# Patient Record
Sex: MALE | Race: WHITE | NOT HISPANIC OR LATINO | URBAN - METROPOLITAN AREA
[De-identification: names, ages, dates, MRNs, and addresses within clinical notes are randomized per-mention and may not be internally consistent; named-entity substitution may affect disease eponyms.]

---

## 2020-05-02 ENCOUNTER — EMERGENCY (EMERGENCY)
Facility: HOSPITAL | Age: 60
LOS: 1 days | Discharge: HOME | End: 2020-05-02
Attending: EMERGENCY MEDICINE
Payer: SELF-PAY

## 2020-05-02 VITALS
WEIGHT: 195.11 LBS | SYSTOLIC BLOOD PRESSURE: 125 MMHG | TEMPERATURE: 98 F | HEART RATE: 64 BPM | OXYGEN SATURATION: 100 % | RESPIRATION RATE: 15 BRPM | HEIGHT: 74 IN | DIASTOLIC BLOOD PRESSURE: 88 MMHG

## 2020-05-02 VITALS
OXYGEN SATURATION: 100 % | RESPIRATION RATE: 19 BRPM | DIASTOLIC BLOOD PRESSURE: 78 MMHG | SYSTOLIC BLOOD PRESSURE: 110 MMHG | HEART RATE: 82 BPM

## 2020-05-02 PROCEDURE — 82962 GLUCOSE BLOOD TEST: CPT

## 2020-05-02 PROCEDURE — 99283 EMERGENCY DEPT VISIT LOW MDM: CPT

## 2020-05-02 NOTE — ED PROVIDER NOTE - CLINICAL SUMMARY MEDICAL DECISION MAKING FREE TEXT BOX
hypoglycemia in light of exercise, heat. Pt took insulin this morning. Refusing to stay in the ER for further evaluation. Tolerating PO glucose given by RN. Called  Endo while in ED for follow up. Rec not to take evening insulin and to verify doses with endocrinology as outpt today which pt states he has follow up. Pt signed out AMA.

## 2020-05-02 NOTE — ED PROVIDER NOTE - PATIENT PORTAL LINK FT
You can access the FollowMyHealth Patient Portal offered by [facility] by registering at the following website: [email or web address]. By joining [employer]’s FollowMyHealth portal, you will also be able to view your health information using other applications (apps) compatible with our system.

## 2020-05-02 NOTE — ED ADULT NURSE REASSESSMENT NOTE - NS ED NURSE REASSESS COMMENT FT1
Patient signed out Against Medical Advice all Risks explained by Dr. JAY  Patient verbalizes understanding , and also given follow up instructions ( written and verbal)  Patient alert and oriented to name, date of birth, current events, location, current and previous presidents ( verbally identified presidents by name)   Patient also able to do serial addition and subtraction   Patient ambulated in ED with a steady gait,   Patient denies dizziness, chest pain and shortness of breath with ambulation   Patient instructed to hold evening dose of insulin, contact Endocrinology and monitor blood sugar levels at home . Patient also provided with additional apple juice and apple sauce and will eat once home   Patient verbalizes understanding and called Endocrinology during departure  Patient verbalizes and understands that he is to return to ER for any concerns

## 2020-05-02 NOTE — ED ADULT NURSE REASSESSMENT NOTE - NS ED NURSE REASSESS COMMENT FT1
Patient given apple juice, milk, apple sauce and a food tray Patient given apple juice, milk, and apple sauce

## 2020-05-02 NOTE — ED ADULT TRIAGE NOTE - CHIEF COMPLAINT QUOTE
hypoglycemic and fell, abrasions on hand   f/s was 23 2 oral glucose and 1 amp dextrose given prior to arrival

## 2020-05-02 NOTE — ED PROVIDER NOTE - OBJECTIVE STATEMENT
61 yo hx of DM on Novolin 20U in morning and 8u before dinner with complaints of hypoglycemia. Per pt, ran 3 miles this morning in sun, became hypoglycemic. Bystanders called EMS, after pt fell. Pt denies hitting head or any other body pain. Pt requesting to go home. Was given oral glucose tabs- 2 and 1 amp dextrose by EMS. Pt w no other somatic complaints, no headache, cough, URI sx, cp, sob, Denies associated SOB, NVD, lightheaded, diaphoresis, palpitations, cough/rhinorrhea, black/bloody stool, LE pain/swelling, focal weakness/numbness,  abd pain, urinary complaints, f/c.

## 2020-05-02 NOTE — ED PROVIDER NOTE - PHYSICAL EXAMINATION
CONSTITUTIONAL: Well appearing, well nourished, awake, alert and in no apparent distress.  HEENT: Head is atraumatic. Eyes clear bilaterally, normal EOMI. Airway patent.  CARDIAC: Normal rate, regular rhythm.  Heart sounds S1, S2.   RESPIRATORY: Breath sounds clear and equal bilaterally. no tachypnea, respiratory distress.   GASTROINTESTINAL: Abdomen soft, non-tender, no guarding, distension.  MUSCULOSKELETAL: Spine appears normal, no midline spinal tenderness, range of motion is not limited, no muscle or joint tenderness. no bony tenderness. no JVD, peripheral edema. no head injury, spine non tender. no cp, abd pain, ext injury  NEUROLOGICAL: Alert and oriented, no focal deficits, no motor or sensory deficits.  SKIN: Skin normal color for race, warm, dry and intact. No evidence of rash.  PSYCHIATRIC: Alert and oriented to person, place, time/situation. normal mood and affect. no apparent risk to self or others.

## 2020-05-23 ENCOUNTER — EMERGENCY (EMERGENCY)
Facility: HOSPITAL | Age: 60
LOS: 1 days | Discharge: HOME | End: 2020-05-23
Attending: EMERGENCY MEDICINE
Payer: SELF-PAY

## 2020-05-23 VITALS
DIASTOLIC BLOOD PRESSURE: 70 MMHG | TEMPERATURE: 98 F | HEART RATE: 76 BPM | HEIGHT: 71 IN | OXYGEN SATURATION: 98 % | RESPIRATION RATE: 20 BRPM | WEIGHT: 199.96 LBS | SYSTOLIC BLOOD PRESSURE: 146 MMHG

## 2020-05-23 LAB
ALBUMIN SERPL ELPH-MCNC: 4 G/DL — SIGNIFICANT CHANGE UP (ref 3.5–5)
ALP SERPL-CCNC: 135 U/L — HIGH (ref 40–120)
ALT FLD-CCNC: 56 U/L DA — SIGNIFICANT CHANGE UP (ref 10–60)
ANION GAP SERPL CALC-SCNC: 5 MMOL/L — SIGNIFICANT CHANGE UP (ref 5–17)
AST SERPL-CCNC: 49 U/L — HIGH (ref 10–40)
BASOPHILS # BLD AUTO: 0.09 K/UL — SIGNIFICANT CHANGE UP (ref 0–0.2)
BASOPHILS NFR BLD AUTO: 0.8 % — SIGNIFICANT CHANGE UP (ref 0–2)
BILIRUB SERPL-MCNC: 1.4 MG/DL — HIGH (ref 0.2–1.2)
BUN SERPL-MCNC: 9 MG/DL — SIGNIFICANT CHANGE UP (ref 7–18)
CALCIUM SERPL-MCNC: 9 MG/DL — SIGNIFICANT CHANGE UP (ref 8.4–10.5)
CHLORIDE SERPL-SCNC: 101 MMOL/L — SIGNIFICANT CHANGE UP (ref 96–108)
CO2 SERPL-SCNC: 34 MMOL/L — HIGH (ref 22–31)
CREAT SERPL-MCNC: 0.85 MG/DL — SIGNIFICANT CHANGE UP (ref 0.5–1.3)
EOSINOPHIL # BLD AUTO: 0.23 K/UL — SIGNIFICANT CHANGE UP (ref 0–0.5)
EOSINOPHIL NFR BLD AUTO: 2.1 % — SIGNIFICANT CHANGE UP (ref 0–6)
GLUCOSE SERPL-MCNC: 19 MG/DL — CRITICAL LOW (ref 70–99)
HCT VFR BLD CALC: 46.4 % — SIGNIFICANT CHANGE UP (ref 39–50)
HGB BLD-MCNC: 15.1 G/DL — SIGNIFICANT CHANGE UP (ref 13–17)
IMM GRANULOCYTES NFR BLD AUTO: 0.4 % — SIGNIFICANT CHANGE UP (ref 0–1.5)
LYMPHOCYTES # BLD AUTO: 1.74 K/UL — SIGNIFICANT CHANGE UP (ref 1–3.3)
LYMPHOCYTES # BLD AUTO: 15.9 % — SIGNIFICANT CHANGE UP (ref 13–44)
MCHC RBC-ENTMCNC: 30.6 PG — SIGNIFICANT CHANGE UP (ref 27–34)
MCHC RBC-ENTMCNC: 32.5 GM/DL — SIGNIFICANT CHANGE UP (ref 32–36)
MCV RBC AUTO: 93.9 FL — SIGNIFICANT CHANGE UP (ref 80–100)
MONOCYTES # BLD AUTO: 0.88 K/UL — SIGNIFICANT CHANGE UP (ref 0–0.9)
MONOCYTES NFR BLD AUTO: 8.1 % — SIGNIFICANT CHANGE UP (ref 2–14)
NEUTROPHILS # BLD AUTO: 7.95 K/UL — HIGH (ref 1.8–7.4)
NEUTROPHILS NFR BLD AUTO: 72.7 % — SIGNIFICANT CHANGE UP (ref 43–77)
NRBC # BLD: 0 /100 WBCS — SIGNIFICANT CHANGE UP (ref 0–0)
PLATELET # BLD AUTO: 274 K/UL — SIGNIFICANT CHANGE UP (ref 150–400)
POTASSIUM SERPL-MCNC: 3.4 MMOL/L — LOW (ref 3.5–5.3)
POTASSIUM SERPL-SCNC: 3.4 MMOL/L — LOW (ref 3.5–5.3)
PROT SERPL-MCNC: 8.4 G/DL — HIGH (ref 6–8.3)
RBC # BLD: 4.94 M/UL — SIGNIFICANT CHANGE UP (ref 4.2–5.8)
RBC # FLD: 12.7 % — SIGNIFICANT CHANGE UP (ref 10.3–14.5)
SODIUM SERPL-SCNC: 140 MMOL/L — SIGNIFICANT CHANGE UP (ref 135–145)
WBC # BLD: 10.93 K/UL — HIGH (ref 3.8–10.5)
WBC # FLD AUTO: 10.93 K/UL — HIGH (ref 3.8–10.5)

## 2020-05-23 PROCEDURE — 36415 COLL VENOUS BLD VENIPUNCTURE: CPT

## 2020-05-23 PROCEDURE — 99283 EMERGENCY DEPT VISIT LOW MDM: CPT

## 2020-05-23 PROCEDURE — 85027 COMPLETE CBC AUTOMATED: CPT

## 2020-05-23 PROCEDURE — 99284 EMERGENCY DEPT VISIT MOD MDM: CPT | Mod: 25

## 2020-05-23 PROCEDURE — 82962 GLUCOSE BLOOD TEST: CPT

## 2020-05-23 PROCEDURE — 80053 COMPREHEN METABOLIC PANEL: CPT

## 2020-05-23 PROCEDURE — 96374 THER/PROPH/DIAG INJ IV PUSH: CPT

## 2020-05-23 RX ORDER — DEXTROSE 50 % IN WATER 50 %
50 SYRINGE (ML) INTRAVENOUS ONCE
Refills: 0 | Status: COMPLETED | OUTPATIENT
Start: 2020-05-23 | End: 2020-05-23

## 2020-05-23 RX ORDER — GLUCAGON INJECTION, SOLUTION 0.5 MG/.1ML
1 INJECTION, SOLUTION SUBCUTANEOUS ONCE
Refills: 0 | Status: DISCONTINUED | OUTPATIENT
Start: 2020-05-23 | End: 2020-05-23

## 2020-05-23 RX ORDER — GLUCAGON INJECTION, SOLUTION 0.5 MG/.1ML
5 INJECTION, SOLUTION SUBCUTANEOUS ONCE
Refills: 0 | Status: DISCONTINUED | OUTPATIENT
Start: 2020-05-23 | End: 2020-05-23

## 2020-05-23 RX ADMIN — Medication 50 MILLILITER(S): at 16:00

## 2020-05-23 NOTE — ED PROVIDER NOTE - OBJECTIVE STATEMENT
61 y/o M with a significant PMHx of IDDM presents with AMS from street. EMS reports patient was uncooperative and resisting. Fingerstick monitor at the time read "low", per EMS.

## 2020-05-23 NOTE — ED ADULT NURSE NOTE - SKIN CAPILLARY REFILL
2 seconds or less You can access the [facility] Patient Portal, offered by [facility], by registering with the following website: [email or web address]

## 2020-05-23 NOTE — ED ADULT NURSE REASSESSMENT NOTE - NS ED NURSE REASSESS COMMENT FT1
pt verbalizes understanding of all risks associated w/ leaving AMA. pt A&Ox4, ambulatory, and in no acute distress at this time. AMA paperwork signed and placed on chart. pt given juice and crackers for the commute home.

## 2020-05-23 NOTE — ED PROVIDER NOTE - CLINICAL SUMMARY MEDICAL DECISION MAKING FREE TEXT BOX
59 y/o M presents for presumed hypoglycemia. Patient was restrained by staff, IV placed and D50 administered. Patient then woke up and is now awake, alert and oriented x3. Patient states he takes Novolog 20-25 units once in the morning and once at night. Patient relates taking his dose this morning and was going home for lunch but was unable to make it in time. Patient states he had chocolate in his pocket but was also unable to reach it in time. Patient wants to leave and is denying further testing or food. I convinced patient to eat a bread roll, pineapple pieces, and drink milk and juice. Repeat fingerstick was in 70s. Patient is now adamant that he wants to leave and is refusing another repeat fingerstick. Patient is awake, A&Ox3. I explained that if this happens again, he might die. Patient is aware and wants to leave against medical advice. 59 y/o M presents for presumed hypoglycemia. Patient was restrained by staff, IV placed and D50 administered. Patient then woke up and is now awake, alert and oriented x3. Patient states he takes Novolog 20-25 units once in the morning and once at night. Patient relates taking his dose this morning and was going home for lunch but was unable to make it in time. Patient states he had chocolate in his pocket but was also unable to reach it in time. Patient wants to leave and is denying further testing or food. I convinced patient to eat a bread roll, pineapple pieces, and drink milk and juice. Repeat fingerstick was in 70s. Patient is now adamant that he wants to leave and is refusing another repeat fingerstick. Patient is awake, A&Ox3. I explained that if this happens again, he might die. Patient is aware and wants to leave against medical advice. awake alert ambulatory with steady gait.

## 2020-05-23 NOTE — ED ADULT NURSE NOTE - OBJECTIVE STATEMENT
pt arrived via EMS, c/o found on the street  with low blood sugar as per EMS. pt responsive but unable to follow instruction and restless. pt has no signs of any  head trauma nor injury. pt given medication on arrival to ED bed 8. pt now sitting up in bed eating crackers, A&Ox4. bs 124

## 2020-05-23 NOTE — ED PROVIDER NOTE - PATIENT PORTAL LINK FT
You can access the FollowMyHealth Patient Portal offered by [facility] by registering at the following website: [email or web address]. By joining [employer]’s FollowMyHealth portal, you will also be able to view your health information using other applications (apps) compatible with our system.

## 2020-05-23 NOTE — ED ADULT TRIAGE NOTE - CHIEF COMPLAINT QUOTE
biba for c/o found on the street  with low blood sugar as per EMS. pt responsive but unable to follow   instruction and restless. pt no signs of any  head trauma nor injury

## 2020-05-30 ENCOUNTER — EMERGENCY (EMERGENCY)
Facility: HOSPITAL | Age: 60
LOS: 1 days | Discharge: LEFT BEFORE DISCHARGE | End: 2020-05-30
Attending: EMERGENCY MEDICINE
Payer: SELF-PAY

## 2020-05-30 VITALS
HEIGHT: 71 IN | DIASTOLIC BLOOD PRESSURE: 76 MMHG | WEIGHT: 179.9 LBS | RESPIRATION RATE: 16 BRPM | HEART RATE: 67 BPM | OXYGEN SATURATION: 98 % | SYSTOLIC BLOOD PRESSURE: 136 MMHG | TEMPERATURE: 98 F

## 2020-05-30 PROCEDURE — 82962 GLUCOSE BLOOD TEST: CPT

## 2020-05-30 PROCEDURE — 99283 EMERGENCY DEPT VISIT LOW MDM: CPT

## 2020-05-30 RX ORDER — DEXTROSE 50 % IN WATER 50 %
50 SYRINGE (ML) INTRAVENOUS ONCE
Refills: 0 | Status: COMPLETED | OUTPATIENT
Start: 2020-05-30 | End: 2020-05-30

## 2020-05-30 RX ADMIN — Medication 50 MILLILITER(S): at 14:02

## 2020-05-30 NOTE — ED PROVIDER NOTE - PATIENT PORTAL LINK FT
You can access the FollowMyHealth Patient Portal offered by [facility] by registering at the following website: [email or web address]. By joining [employer]’s FollowMyHealth portal, you will also be able to view your health information using other applications (apps) compatible with our system.

## 2020-05-30 NOTE — ED PROVIDER NOTE - NSFOLLOWUPINSTRUCTIONS_ED_ALL_ED_FT
You decided to leave against medical advice although you understood and verbalized the severity of hypoglycemia, including death and permanent brain damage.   Follow up with your endocrinologist within 24hrs.  Return to the ER for any concerns.

## 2020-05-30 NOTE — ED PROVIDER NOTE - NSFOLLOWUPCLINICS_GEN_ALL_ED_FT
[facility]  [facility] Office  [address] - [address]nd [place] [address]  Phone: [phone]  Fax: [phone]    [facility]  Endocrinology  [address] [place] [id number]  Phone: [phone]  Fax:   Follow Up Time:

## 2020-05-30 NOTE — ED PROVIDER NOTE - PROGRESS NOTE DETAILS
D/w pt at length need for labs, further evaluation and admission, Endocrine consult. Refuses labs, admission, wants to leave AMA. AAOx4, denies HI/SI/hallucinations. Understands and verbalizes risks of hypoglycemia /AMA, including death and permanent brain damage/disability. Recommended rapid f/u w Endocrine. Will AMA

## 2020-05-30 NOTE — ED PROVIDER NOTE - OBJECTIVE STATEMENT
59yo M with a significant PMHx of IDDM presents with AMS from street. EMS reports patient fell down. Fingerstick monitor at the time read "low". Pt with similar episodes and ER visits before, last week refused admission, signed out AMA. FS in the ER-34, improved w D50, pt back to baseline. Denies any symptoms prior to LOC, no infectious stx, no CP/back pains/SOB.  Denies SI/HI and hallucinations

## 2020-05-30 NOTE — ED PROVIDER NOTE - CLINICAL SUMMARY MEDICAL DECISION MAKING FREE TEXT BOX
61yo M w IDDM, w hypoglycemia. Offered labs/ admission, pt declines, back to baseline AAOx4, neuro intact. Will AMA

## 2020-05-30 NOTE — ED ADULT NURSE NOTE - NSIMPLEMENTINTERV_GEN_ALL_ED
Implemented All Universal Safety Interventions:  [place] to call system. Call bell, personal items and telephone within reach. Instruct patient to call for assistance. Room bathroom lighting operational. Non-slip footwear when patient is off stretcher. Physically safe environment: no spills, clutter or unnecessary equipment. Stretcher in lowest position, wheels locked, appropriate side rails in place.

## 2020-05-30 NOTE — ED PROVIDER NOTE - PHYSICAL EXAMINATION
Well appearing, in NAD  Moist mucosae  Pink conjunctivae  Lungs clear  Cardiac w RRR, no JVD  Abdomen soft/NT  No CVAT  Abrasion to R knee, no bony TTP, full ROM, bearing weight and ambulating w normal gait  No pedal edema, no calf TTP  AAOx3, no gross neuro deficits

## 2020-06-13 ENCOUNTER — EMERGENCY (EMERGENCY)
Facility: HOSPITAL | Age: 60
LOS: 1 days | Discharge: HOME | End: 2020-06-13
Attending: EMERGENCY MEDICINE
Payer: SELF-PAY

## 2020-06-13 VITALS
HEART RATE: 50 BPM | WEIGHT: 190.04 LBS | OXYGEN SATURATION: 96 % | RESPIRATION RATE: 15 BRPM | HEIGHT: 75 IN | TEMPERATURE: 98 F | DIASTOLIC BLOOD PRESSURE: 73 MMHG | SYSTOLIC BLOOD PRESSURE: 143 MMHG

## 2020-06-13 LAB
ACETONE SERPL-MCNC: NEGATIVE — SIGNIFICANT CHANGE UP
ALBUMIN SERPL ELPH-MCNC: 3.9 G/DL — SIGNIFICANT CHANGE UP (ref 3.5–5)
ALP SERPL-CCNC: 111 U/L — SIGNIFICANT CHANGE UP (ref 40–120)
ALT FLD-CCNC: 66 U/L DA — HIGH (ref 10–60)
ANION GAP SERPL CALC-SCNC: 6 MMOL/L — SIGNIFICANT CHANGE UP (ref 5–17)
AST SERPL-CCNC: 53 U/L — HIGH (ref 10–40)
BASOPHILS # BLD AUTO: 0.08 K/UL — SIGNIFICANT CHANGE UP (ref 0–0.2)
BASOPHILS NFR BLD AUTO: 1 % — SIGNIFICANT CHANGE UP (ref 0–2)
BILIRUB SERPL-MCNC: 1.6 MG/DL — HIGH (ref 0.2–1.2)
BUN SERPL-MCNC: 11 MG/DL — SIGNIFICANT CHANGE UP (ref 7–18)
CALCIUM SERPL-MCNC: 9.1 MG/DL — SIGNIFICANT CHANGE UP (ref 8.4–10.5)
CHLORIDE SERPL-SCNC: 104 MMOL/L — SIGNIFICANT CHANGE UP (ref 96–108)
CO2 SERPL-SCNC: 33 MMOL/L — HIGH (ref 22–31)
CREAT SERPL-MCNC: 0.87 MG/DL — SIGNIFICANT CHANGE UP (ref 0.5–1.3)
EOSINOPHIL # BLD AUTO: 0.3 K/UL — SIGNIFICANT CHANGE UP (ref 0–0.5)
EOSINOPHIL NFR BLD AUTO: 3.6 % — SIGNIFICANT CHANGE UP (ref 0–6)
GLUCOSE SERPL-MCNC: 15 MG/DL — CRITICAL LOW (ref 70–99)
HCT VFR BLD CALC: 44.6 % — SIGNIFICANT CHANGE UP (ref 39–50)
HGB BLD-MCNC: 14.9 G/DL — SIGNIFICANT CHANGE UP (ref 13–17)
IMM GRANULOCYTES NFR BLD AUTO: 0.4 % — SIGNIFICANT CHANGE UP (ref 0–1.5)
LYMPHOCYTES # BLD AUTO: 1.69 K/UL — SIGNIFICANT CHANGE UP (ref 1–3.3)
LYMPHOCYTES # BLD AUTO: 20.5 % — SIGNIFICANT CHANGE UP (ref 13–44)
MAGNESIUM SERPL-MCNC: 2 MG/DL — SIGNIFICANT CHANGE UP (ref 1.6–2.6)
MCHC RBC-ENTMCNC: 30.9 PG — SIGNIFICANT CHANGE UP (ref 27–34)
MCHC RBC-ENTMCNC: 33.4 GM/DL — SIGNIFICANT CHANGE UP (ref 32–36)
MCV RBC AUTO: 92.5 FL — SIGNIFICANT CHANGE UP (ref 80–100)
MONOCYTES # BLD AUTO: 0.74 K/UL — SIGNIFICANT CHANGE UP (ref 0–0.9)
MONOCYTES NFR BLD AUTO: 9 % — SIGNIFICANT CHANGE UP (ref 2–14)
NEUTROPHILS # BLD AUTO: 5.42 K/UL — SIGNIFICANT CHANGE UP (ref 1.8–7.4)
NEUTROPHILS NFR BLD AUTO: 65.5 % — SIGNIFICANT CHANGE UP (ref 43–77)
NRBC # BLD: 0 /100 WBCS — SIGNIFICANT CHANGE UP (ref 0–0)
PLATELET # BLD AUTO: 223 K/UL — SIGNIFICANT CHANGE UP (ref 150–400)
POTASSIUM SERPL-MCNC: 3.4 MMOL/L — LOW (ref 3.5–5.3)
POTASSIUM SERPL-SCNC: 3.4 MMOL/L — LOW (ref 3.5–5.3)
PROT SERPL-MCNC: 8.1 G/DL — SIGNIFICANT CHANGE UP (ref 6–8.3)
RBC # BLD: 4.82 M/UL — SIGNIFICANT CHANGE UP (ref 4.2–5.8)
RBC # FLD: 12.6 % — SIGNIFICANT CHANGE UP (ref 10.3–14.5)
SODIUM SERPL-SCNC: 143 MMOL/L — SIGNIFICANT CHANGE UP (ref 135–145)
WBC # BLD: 8.26 K/UL — SIGNIFICANT CHANGE UP (ref 3.8–10.5)
WBC # FLD AUTO: 8.26 K/UL — SIGNIFICANT CHANGE UP (ref 3.8–10.5)

## 2020-06-13 PROCEDURE — 85027 COMPLETE CBC AUTOMATED: CPT

## 2020-06-13 PROCEDURE — 36415 COLL VENOUS BLD VENIPUNCTURE: CPT

## 2020-06-13 PROCEDURE — 83735 ASSAY OF MAGNESIUM: CPT

## 2020-06-13 PROCEDURE — 82009 KETONE BODYS QUAL: CPT

## 2020-06-13 PROCEDURE — 82962 GLUCOSE BLOOD TEST: CPT

## 2020-06-13 PROCEDURE — 80053 COMPREHEN METABOLIC PANEL: CPT

## 2020-06-13 PROCEDURE — 99291 CRITICAL CARE FIRST HOUR: CPT | Mod: 25

## 2020-06-13 PROCEDURE — 99291 CRITICAL CARE FIRST HOUR: CPT

## 2020-06-13 RX ORDER — DEXTROSE 50 % IN WATER 50 %
50 SYRINGE (ML) INTRAVENOUS ONCE
Refills: 0 | Status: COMPLETED | OUTPATIENT
Start: 2020-06-13 | End: 2020-06-13

## 2020-06-13 RX ADMIN — Medication 50 MILLILITER(S): at 18:05

## 2020-06-13 RX ADMIN — Medication 50 MILLILITER(S): at 16:36

## 2020-06-13 NOTE — ED PROVIDER NOTE - OBJECTIVE STATEMENT
61 y/o M pt with a significant PMHx of DM was brought into the ED for unresponsiveness with foaming in his mouth and diaphoresis. Pt reports taking NPH at 10am and noted blood sugar was 125 then. Pt states he remembers at 4:30pm that he went out to get something to eat, and on his way back he remembers climbing stairs. EMS was contacted by neighbors, and patient was found to be unresponsive in stairway. Blood sugar was found to be 24 in ER, was given D50 and was awake.  Pt denies any shaking, injuries or any other complaints.

## 2020-06-13 NOTE — ED PROVIDER NOTE - PROGRESS NOTE DETAILS
Pt claims he's feeling much better, refuses CXR, u/a, repeat , pt does not want to be observed anymore, just wants to go home.  Advised to eat when taking Insulin.  Pt's Ox3, ambulating with no difficulty.

## 2020-06-13 NOTE — ED PROVIDER NOTE - ENMT, MLM
Airway patent, Nasal mucosa clear. Mouth with normal mucosa. Throat has no vesicles, no oropharyngeal exudates and uvula is midline.  No tongue biting

## 2020-06-13 NOTE — ED PROVIDER NOTE - CLINICAL SUMMARY MEDICAL DECISION MAKING FREE TEXT BOX
Pt with acute unresponsiveness secondary to hypoglycemia. Given D50 and pt became awake. Will do labs and reassess. Pt with acute unresponsiveness secondary to hypoglycemia. Given D50 and pt became awake. Will do labs and reassess, concern for infectious process..

## 2020-06-13 NOTE — ED PROVIDER NOTE - NSFOLLOWUPINSTRUCTIONS_ED_ALL_ED_FT
What to Do if Your Blood Sugar is Low    WHAT YOU NEED TO KNOW:    Low blood sugar levels can cause you to have falls, accidents, and injuries. A blood sugar level that gets too low can lead to seizures, coma, and death. Learn to recognize the symptoms early so you can get treatment quickly.     DISCHARGE INSTRUCTIONS:    Have someone call 911 if:     You cannot be woken.      You have a seizure.    Return to the emergency department if:     You have symptoms of a low blood sugar level, such as trouble thinking, sweating, or a pounding heartbeat.      Your blood sugar level is lower than normal and it does not improve with treatment.     Contact your healthcare provider if:     You often have lower blood sugar levels than your target goals.      You have trouble coping with your illness, or you feel anxious or depressed.       You have questions or concerns about your condition or care.     What to do if you have symptoms of low blood sugar: If you are sweaty, anxious, confused or have a fast, pounding heartbeat:     Check your blood sugar level, if possible. Your blood sugar level is too low if it is at or below 70 mg/dL.       Eat or drink 15 grams of fast-acting carbohydrate. Fast-acting carbohydrates will raise your blood sugar level quickly. Examples of 15 grams of fast-acting carbohydrates:   4 ounces (½ cup) of fruit juice       4 ounces of regular soda      2 tablespoons of raisins       1 tube of glucose gel or 3 to 4 glucose tablets  Ways to Raise Your Blood Sugar         Check your blood sugar level 15 minutes later. If the level is still low (less than 100 mg/dL), eat another 15 grams of carbohydrate. When the level returns to 100 mg/dL, eat a snack or meal that contains carbohydrates. This will help prevent another drop in blood sugar.      Teach people close to you how to use your glucagon kit. Your blood sugar may be too low for you to be awake. People need to know when and how to use your kit.    Prevent low blood sugar levels: Prevent low blood sugar by knowing what increases your risk. Ask your healthcare provider for ways to prevent low blood sugar levels. Any of the following can increase your risk of low blood sugar:     Fasting for tests or procedures      During or after intense exercise      Late or postponed meals      Sleeping (you may need a bedtime snack)     Follow up with your healthcare provider as directed: Write down your questions so you remember to ask them during your visits.

## 2020-06-13 NOTE — ED ADULT NURSE NOTE - NSIMPLEMENTINTERV_GEN_ALL_ED
Implemented All Fall Risk Interventions:  [place] to call system. Call bell, personal items and telephone within reach. Instruct patient to call for assistance. Room bathroom lighting operational. Non-slip footwear when patient is off stretcher. Physically safe environment: no spills, clutter or unnecessary equipment. Stretcher in lowest position, wheels locked, appropriate side rails in place. Provide visual cue, wrist band, yellow gown, etc. Monitor gait and stability. Monitor for mental status changes and reorient to person, place, and time. Review medications for side effects contributing to fall risk. Reinforce activity limits and safety measures with patient and family.

## 2020-06-13 NOTE — ED PROVIDER NOTE - PATIENT PORTAL LINK FT
You can access the FollowMyHealth Patient Portal offered by [facility] by registering at the following website: [email or web address]. By joining [employer]’s FollowMyHealth portal, you will also be able to view your health information using other applications (apps) compatible with our system.

## 2020-06-27 ENCOUNTER — EMERGENCY (EMERGENCY)
Facility: HOSPITAL | Age: 60
LOS: 1 days | Discharge: LEFT BEFORE DISCHARGE | End: 2020-06-27
Attending: EMERGENCY MEDICINE
Payer: SELF-PAY

## 2020-06-27 VITALS
HEART RATE: 61 BPM | RESPIRATION RATE: 16 BRPM | OXYGEN SATURATION: 98 % | DIASTOLIC BLOOD PRESSURE: 97 MMHG | SYSTOLIC BLOOD PRESSURE: 157 MMHG

## 2020-06-27 VITALS
RESPIRATION RATE: 16 BRPM | HEART RATE: 51 BPM | SYSTOLIC BLOOD PRESSURE: 136 MMHG | OXYGEN SATURATION: 99 % | DIASTOLIC BLOOD PRESSURE: 71 MMHG

## 2020-06-27 PROCEDURE — 99283 EMERGENCY DEPT VISIT LOW MDM: CPT

## 2020-06-27 PROCEDURE — 82962 GLUCOSE BLOOD TEST: CPT

## 2020-06-27 RX ORDER — DEXTROSE 50 % IN WATER 50 %
50 SYRINGE (ML) INTRAVENOUS ONCE
Refills: 0 | Status: COMPLETED | OUTPATIENT
Start: 2020-06-27 | End: 2020-06-27

## 2020-06-27 RX ADMIN — Medication 50 MILLILITER(S): at 14:00

## 2020-06-27 NOTE — ED ADULT TRIAGE NOTE - CHIEF COMPLAINT QUOTE
Found outside by EMS unresponsive with bs <20.  Patient arrived to ED aggressive and physically trying to push EMS aside to leave.  Code ely called

## 2020-06-27 NOTE — ED ADULT NURSE NOTE - NSIMPLEMENTINTERV_GEN_ALL_ED
Implemented All Fall with Harm Risk Interventions:  [place] to call system. Call bell, personal items and telephone within reach. Instruct patient to call for assistance. Room bathroom lighting operational. Non-slip footwear when patient is off stretcher. Physically safe environment: no spills, clutter or unnecessary equipment. Stretcher in lowest position, wheels locked, appropriate side rails in place. Provide visual cue, wrist band, yellow gown, etc. Monitor gait and stability. Monitor for mental status changes and reorient to person, place, and time. Review medications for side effects contributing to fall risk. Reinforce activity limits and safety measures with patient and family. Provide visual clues: red socks.

## 2020-06-27 NOTE — ED PROVIDER NOTE - OBJECTIVE STATEMENT
60 y.o male with a PMHx of DM and no pshX presents to the ED c/o multiple episodes of hypoglycemia. Patient states that he took his meds and breakfast this morning and was out running errands and did not have lunch or a snack. In ED, patient finger stick is 34. Patient is awake and alert and asking to be d.c . Patient was given D 50 and some food, observed for sometimes and reassessed again. Upon reassessment patient is still asking to be d/c . Patient denies any other acute complaints. NKDA

## 2020-06-27 NOTE — ED ADULT NURSE NOTE - OBJECTIVE STATEMENT
Patient BIBA for low blood glucose levels. As per patient not his first time. Has happened to him multiple times. He was out shopping and became dizzy and passed out, bystander called EMS. Patient notes he spoke to endocrinologist and has a F/U appointment.

## 2020-06-27 NOTE — ED ADULT NURSE NOTE - ED STAT RN HANDOFF DETAILS
Patient requesting to leave " stating he feel fine whether MD discharges him or not he's leaving. IV access removed all discharge paperwork provided to patient. Patient verbalizes understanding leaving ambulatory in no acute distress.

## 2020-06-27 NOTE — ED PROVIDER NOTE - PATIENT PORTAL LINK FT
You can access the FollowMyHealth Patient Portal offered by [facility] by registering at the following website: [email or web address]. By joining [employer]’s FollowMyHealth portal, you will also be able to view your health information using other applications (apps) compatible with our system.

## 2020-07-17 NOTE — ED ADULT NURSE NOTE - SKIN INTEGRITY
Aortic root size stable, will require follow-up in office in 1 year and follow-up echo in 2 years.  Disregard previous note. intact

## 2020-08-22 ENCOUNTER — INPATIENT (INPATIENT)
Facility: HOSPITAL | Age: 60
LOS: 1 days | Discharge: LEFT BEFORE DISCHARGE | DRG: 482 | End: 2020-08-24
Attending: STUDENT IN AN ORGANIZED HEALTH CARE EDUCATION/TRAINING PROGRAM | Admitting: STUDENT IN AN ORGANIZED HEALTH CARE EDUCATION/TRAINING PROGRAM
Payer: SELF-PAY

## 2020-08-22 VITALS
HEIGHT: 74 IN | SYSTOLIC BLOOD PRESSURE: 155 MMHG | DIASTOLIC BLOOD PRESSURE: 79 MMHG | RESPIRATION RATE: 16 BRPM | TEMPERATURE: 99 F | HEART RATE: 66 BPM | WEIGHT: 184.97 LBS | OXYGEN SATURATION: 98 %

## 2020-08-22 PROCEDURE — 73552 X-RAY EXAM OF FEMUR 2/>: CPT | Mod: 26,RT

## 2020-08-22 PROCEDURE — 72170 X-RAY EXAM OF PELVIS: CPT | Mod: 26

## 2020-08-22 RX ORDER — ACETAMINOPHEN 500 MG
650 TABLET ORAL ONCE
Refills: 0 | Status: COMPLETED | OUTPATIENT
Start: 2020-08-22 | End: 2020-08-22

## 2020-08-22 RX ADMIN — Medication 650 MILLIGRAM(S): at 23:17

## 2020-08-22 NOTE — ED PROVIDER NOTE - PROGRESS NOTE DETAILS
xray with intertrochanteric hip fracture. Surgery PA made aware.  Ortho attending, Dr. Marques called and informed of patient. Will admit to hospitalist, Dr. Azevedo, under [facility].

## 2020-08-22 NOTE — ED PROVIDER NOTE - CLINICAL SUMMARY MEDICAL DECISION MAKING FREE TEXT BOX
Patient presenting with hypoglycemia resulting in mechanical fall. Patient currently A&Ox3. Refusing labs/blood work. Will order x-ray fever the leg, give analgesia, give tray of food, and reassess.

## 2020-08-22 NOTE — ED ADULT NURSE NOTE - CHPI ED NUR SEVERITY2
Patient with LMP end of January  She was in bed for 5 days with flu, had sore nipples and lesions on her areola bilaterally, associated with breast pain  Saw PCP, she treated herself with Ibuprofen  PCP gave her antibiotics for presumed mastitis  She had a PAIN SCALE 8 OF 10.

## 2020-08-22 NOTE — ED ADULT NURSE NOTE - NSIMPLEMENTINTERV_GEN_ALL_ED
Implemented All Fall Risk Interventions:  [place] to call system. Call bell, personal items and telephone within reach. Instruct patient to call for assistance. Room bathroom lighting operational. Non-slip footwear when patient is off stretcher. Physically safe environment: no spills, clutter or unnecessary equipment. Stretcher in lowest position, wheels locked, appropriate side rails in place. Provide visual cue, wrist band, yellow gown, etc. Monitor gait and stability. Monitor for mental status changes and reorient to person, place, and time. Review medications for side effects contributing to fall risk. Reinforce activity limits and safety measures with patient and family.

## 2020-08-22 NOTE — ED PROVIDER NOTE - OBJECTIVE STATEMENT
60 year old male with PMHx of DM presenting s/p fall today. Patient took medication this evening and felt his sugar become low, he had some snacks in his pocket which he ate and thought would help him make it home, however, he was unable to make it home and fell to the ground. Patient currently complaining of right thigh pain. Denies LOC, head trauma, nausea, vomiting, or any other complaints. Patient was given food by EMS and in the ED patient currently feeling back to baseline.

## 2020-08-22 NOTE — ED ADULT TRIAGE NOTE - CHIEF COMPLAINT QUOTE
BIBA c/o R upper leg pain after fall (from standing), pt states he felt weak from low blood sugar, h/o DM, ems states BG=62 on scene, was given oral glucose and repeat BG=82, abrasions to eliza elbow, denies head, neck involvement, denies LOC

## 2020-08-23 DIAGNOSIS — R74.0 NONSPECIFIC ELEVATION OF LEVELS OF TRANSAMINASE AND LACTIC ACID DEHYDROGENASE [LDH]: ICD-10-CM

## 2020-08-23 DIAGNOSIS — E11.9 TYPE 2 DIABETES MELLITUS WITHOUT COMPLICATIONS: ICD-10-CM

## 2020-08-23 DIAGNOSIS — E16.2 HYPOGLYCEMIA, UNSPECIFIED: ICD-10-CM

## 2020-08-23 DIAGNOSIS — S72.001A FRACTURE OF UNSPECIFIED PART OF NECK OF RIGHT FEMUR, INITIAL ENCOUNTER FOR CLOSED FRACTURE: ICD-10-CM

## 2020-08-23 DIAGNOSIS — Z29.9 ENCOUNTER FOR PROPHYLACTIC MEASURES, UNSPECIFIED: ICD-10-CM

## 2020-08-23 DIAGNOSIS — S72.009A FRACTURE OF UNSPECIFIED PART OF NECK OF UNSPECIFIED FEMUR, INITIAL ENCOUNTER FOR CLOSED FRACTURE: ICD-10-CM

## 2020-08-23 DIAGNOSIS — D72.829 ELEVATED WHITE BLOOD CELL COUNT, UNSPECIFIED: ICD-10-CM

## 2020-08-23 LAB
24R-OH-CALCIDIOL SERPL-MCNC: 36.1 NG/ML — SIGNIFICANT CHANGE UP (ref 30–80)
A1C WITH ESTIMATED AVERAGE GLUCOSE RESULT: 7 % — HIGH (ref 4–5.6)
ALBUMIN SERPL ELPH-MCNC: 3.3 G/DL — LOW (ref 3.5–5)
ALBUMIN SERPL ELPH-MCNC: 3.8 G/DL — SIGNIFICANT CHANGE UP (ref 3.5–5)
ALP SERPL-CCNC: 107 U/L — SIGNIFICANT CHANGE UP (ref 40–120)
ALP SERPL-CCNC: 120 U/L — SIGNIFICANT CHANGE UP (ref 40–120)
ALT FLD-CCNC: 61 U/L DA — HIGH (ref 10–60)
ALT FLD-CCNC: 69 U/L DA — HIGH (ref 10–60)
ANION GAP SERPL CALC-SCNC: 4 MMOL/L — LOW (ref 5–17)
ANION GAP SERPL CALC-SCNC: 6 MMOL/L — SIGNIFICANT CHANGE UP (ref 5–17)
ANION GAP SERPL CALC-SCNC: 6 MMOL/L — SIGNIFICANT CHANGE UP (ref 5–17)
APPEARANCE UR: CLEAR — SIGNIFICANT CHANGE UP
APTT BLD: 30.6 SEC — SIGNIFICANT CHANGE UP (ref 27.5–35.5)
AST SERPL-CCNC: 56 U/L — HIGH (ref 10–40)
AST SERPL-CCNC: 65 U/L — HIGH (ref 10–40)
BASOPHILS # BLD AUTO: 0.06 K/UL — SIGNIFICANT CHANGE UP (ref 0–0.2)
BASOPHILS # BLD AUTO: 0.1 K/UL — SIGNIFICANT CHANGE UP (ref 0–0.2)
BASOPHILS NFR BLD AUTO: 0.3 % — SIGNIFICANT CHANGE UP (ref 0–2)
BASOPHILS NFR BLD AUTO: 0.7 % — SIGNIFICANT CHANGE UP (ref 0–2)
BILIRUB SERPL-MCNC: 1.6 MG/DL — HIGH (ref 0.2–1.2)
BILIRUB SERPL-MCNC: 2.5 MG/DL — HIGH (ref 0.2–1.2)
BILIRUB UR-MCNC: NEGATIVE — SIGNIFICANT CHANGE UP
BLD GP AB SCN SERPL QL: SIGNIFICANT CHANGE UP
BUN SERPL-MCNC: 12 MG/DL — SIGNIFICANT CHANGE UP (ref 7–18)
BUN SERPL-MCNC: 13 MG/DL — SIGNIFICANT CHANGE UP (ref 7–18)
BUN SERPL-MCNC: 14 MG/DL — SIGNIFICANT CHANGE UP (ref 7–18)
CALCIUM SERPL-MCNC: 8.4 MG/DL — SIGNIFICANT CHANGE UP (ref 8.4–10.5)
CALCIUM SERPL-MCNC: 8.7 MG/DL — SIGNIFICANT CHANGE UP (ref 8.4–10.5)
CALCIUM SERPL-MCNC: 8.9 MG/DL — SIGNIFICANT CHANGE UP (ref 8.4–10.5)
CHLORIDE SERPL-SCNC: 100 MMOL/L — SIGNIFICANT CHANGE UP (ref 96–108)
CHLORIDE SERPL-SCNC: 100 MMOL/L — SIGNIFICANT CHANGE UP (ref 96–108)
CHLORIDE SERPL-SCNC: 101 MMOL/L — SIGNIFICANT CHANGE UP (ref 96–108)
CO2 SERPL-SCNC: 29 MMOL/L — SIGNIFICANT CHANGE UP (ref 22–31)
COLOR SPEC: YELLOW — SIGNIFICANT CHANGE UP
CREAT SERPL-MCNC: 1.01 MG/DL — SIGNIFICANT CHANGE UP (ref 0.5–1.3)
CREAT SERPL-MCNC: 1.08 MG/DL — SIGNIFICANT CHANGE UP (ref 0.5–1.3)
CREAT SERPL-MCNC: 1.13 MG/DL — SIGNIFICANT CHANGE UP (ref 0.5–1.3)
DIFF PNL FLD: NEGATIVE — SIGNIFICANT CHANGE UP
EOSINOPHIL # BLD AUTO: 0.02 K/UL — SIGNIFICANT CHANGE UP (ref 0–0.5)
EOSINOPHIL # BLD AUTO: 0.22 K/UL — SIGNIFICANT CHANGE UP (ref 0–0.5)
EOSINOPHIL NFR BLD AUTO: 0.1 % — SIGNIFICANT CHANGE UP (ref 0–6)
EOSINOPHIL NFR BLD AUTO: 1.4 % — SIGNIFICANT CHANGE UP (ref 0–6)
ESTIMATED AVERAGE GLUCOSE: 154 MG/DL — HIGH (ref 68–114)
GLUCOSE BLDC GLUCOMTR-MCNC: 279 MG/DL — HIGH (ref 70–99)
GLUCOSE BLDC GLUCOMTR-MCNC: 301 MG/DL — HIGH (ref 70–99)
GLUCOSE BLDC GLUCOMTR-MCNC: 302 MG/DL — HIGH (ref 70–99)
GLUCOSE BLDC GLUCOMTR-MCNC: 313 MG/DL — HIGH (ref 70–99)
GLUCOSE BLDC GLUCOMTR-MCNC: 327 MG/DL — HIGH (ref 70–99)
GLUCOSE BLDC GLUCOMTR-MCNC: 350 MG/DL — HIGH (ref 70–99)
GLUCOSE SERPL-MCNC: 268 MG/DL — HIGH (ref 70–99)
GLUCOSE SERPL-MCNC: 273 MG/DL — HIGH (ref 70–99)
GLUCOSE SERPL-MCNC: 337 MG/DL — HIGH (ref 70–99)
GLUCOSE UR QL: 250
HCT VFR BLD CALC: 41.3 % — SIGNIFICANT CHANGE UP (ref 39–50)
HCT VFR BLD CALC: 42.1 % — SIGNIFICANT CHANGE UP (ref 39–50)
HCT VFR BLD CALC: 42.2 % — SIGNIFICANT CHANGE UP (ref 39–50)
HGB BLD-MCNC: 13.5 G/DL — SIGNIFICANT CHANGE UP (ref 13–17)
HGB BLD-MCNC: 13.7 G/DL — SIGNIFICANT CHANGE UP (ref 13–17)
HGB BLD-MCNC: 14 G/DL — SIGNIFICANT CHANGE UP (ref 13–17)
IMM GRANULOCYTES NFR BLD AUTO: 0.4 % — SIGNIFICANT CHANGE UP (ref 0–1.5)
IMM GRANULOCYTES NFR BLD AUTO: 0.5 % — SIGNIFICANT CHANGE UP (ref 0–1.5)
INR BLD: 1.11 RATIO — SIGNIFICANT CHANGE UP (ref 0.88–1.16)
KETONES UR-MCNC: NEGATIVE — SIGNIFICANT CHANGE UP
LEUKOCYTE ESTERASE UR-ACNC: NEGATIVE — SIGNIFICANT CHANGE UP
LYMPHOCYTES # BLD AUTO: 0.78 K/UL — LOW (ref 1–3.3)
LYMPHOCYTES # BLD AUTO: 1.19 K/UL — SIGNIFICANT CHANGE UP (ref 1–3.3)
LYMPHOCYTES # BLD AUTO: 4.1 % — LOW (ref 13–44)
LYMPHOCYTES # BLD AUTO: 7.8 % — LOW (ref 13–44)
MAGNESIUM SERPL-MCNC: 2.2 MG/DL — SIGNIFICANT CHANGE UP (ref 1.6–2.6)
MCHC RBC-ENTMCNC: 30.4 PG — SIGNIFICANT CHANGE UP (ref 27–34)
MCHC RBC-ENTMCNC: 30.5 PG — SIGNIFICANT CHANGE UP (ref 27–34)
MCHC RBC-ENTMCNC: 30.7 PG — SIGNIFICANT CHANGE UP (ref 27–34)
MCHC RBC-ENTMCNC: 32.5 GM/DL — SIGNIFICANT CHANGE UP (ref 32–36)
MCHC RBC-ENTMCNC: 32.7 GM/DL — SIGNIFICANT CHANGE UP (ref 32–36)
MCHC RBC-ENTMCNC: 33.3 GM/DL — SIGNIFICANT CHANGE UP (ref 32–36)
MCV RBC AUTO: 92.3 FL — SIGNIFICANT CHANGE UP (ref 80–100)
MCV RBC AUTO: 93.2 FL — SIGNIFICANT CHANGE UP (ref 80–100)
MCV RBC AUTO: 93.6 FL — SIGNIFICANT CHANGE UP (ref 80–100)
MONOCYTES # BLD AUTO: 0.95 K/UL — HIGH (ref 0–0.9)
MONOCYTES # BLD AUTO: 1.22 K/UL — HIGH (ref 0–0.9)
MONOCYTES NFR BLD AUTO: 4.9 % — SIGNIFICANT CHANGE UP (ref 2–14)
MONOCYTES NFR BLD AUTO: 8 % — SIGNIFICANT CHANGE UP (ref 2–14)
NEUTROPHILS # BLD AUTO: 12.4 K/UL — HIGH (ref 1.8–7.4)
NEUTROPHILS # BLD AUTO: 17.31 K/UL — HIGH (ref 1.8–7.4)
NEUTROPHILS NFR BLD AUTO: 81.6 % — HIGH (ref 43–77)
NEUTROPHILS NFR BLD AUTO: 90.2 % — HIGH (ref 43–77)
NITRITE UR-MCNC: NEGATIVE — SIGNIFICANT CHANGE UP
NRBC # BLD: 0 /100 WBCS — SIGNIFICANT CHANGE UP (ref 0–0)
PH UR: 7 — SIGNIFICANT CHANGE UP (ref 5–8)
PHOSPHATE SERPL-MCNC: 2.9 MG/DL — SIGNIFICANT CHANGE UP (ref 2.5–4.5)
PLATELET # BLD AUTO: 152 K/UL — SIGNIFICANT CHANGE UP (ref 150–400)
PLATELET # BLD AUTO: 165 K/UL — SIGNIFICANT CHANGE UP (ref 150–400)
PLATELET # BLD AUTO: 191 K/UL — SIGNIFICANT CHANGE UP (ref 150–400)
POTASSIUM SERPL-MCNC: 3.8 MMOL/L — SIGNIFICANT CHANGE UP (ref 3.5–5.3)
POTASSIUM SERPL-MCNC: 4.2 MMOL/L — SIGNIFICANT CHANGE UP (ref 3.5–5.3)
POTASSIUM SERPL-MCNC: 4.6 MMOL/L — SIGNIFICANT CHANGE UP (ref 3.5–5.3)
POTASSIUM SERPL-SCNC: 3.8 MMOL/L — SIGNIFICANT CHANGE UP (ref 3.5–5.3)
POTASSIUM SERPL-SCNC: 4.2 MMOL/L — SIGNIFICANT CHANGE UP (ref 3.5–5.3)
POTASSIUM SERPL-SCNC: 4.6 MMOL/L — SIGNIFICANT CHANGE UP (ref 3.5–5.3)
PROT SERPL-MCNC: 6.9 G/DL — SIGNIFICANT CHANGE UP (ref 6–8.3)
PROT SERPL-MCNC: 7.7 G/DL — SIGNIFICANT CHANGE UP (ref 6–8.3)
PROT UR-MCNC: 15
PROTHROM AB SERPL-ACNC: 12.9 SEC — SIGNIFICANT CHANGE UP (ref 10.6–13.6)
RBC # BLD: 4.43 M/UL — SIGNIFICANT CHANGE UP (ref 4.2–5.8)
RBC # BLD: 4.51 M/UL — SIGNIFICANT CHANGE UP (ref 4.2–5.8)
RBC # BLD: 4.56 M/UL — SIGNIFICANT CHANGE UP (ref 4.2–5.8)
RBC # FLD: 12.2 % — SIGNIFICANT CHANGE UP (ref 10.3–14.5)
RBC # FLD: 12.2 % — SIGNIFICANT CHANGE UP (ref 10.3–14.5)
RBC # FLD: 12.3 % — SIGNIFICANT CHANGE UP (ref 10.3–14.5)
SARS-COV-2 IGG SERPL QL IA: POSITIVE
SARS-COV-2 IGM SERPL IA-ACNC: 3.4 INDEX — HIGH
SARS-COV-2 RNA SPEC QL NAA+PROBE: SIGNIFICANT CHANGE UP
SODIUM SERPL-SCNC: 134 MMOL/L — LOW (ref 135–145)
SODIUM SERPL-SCNC: 135 MMOL/L — SIGNIFICANT CHANGE UP (ref 135–145)
SODIUM SERPL-SCNC: 135 MMOL/L — SIGNIFICANT CHANGE UP (ref 135–145)
SP GR SPEC: 1.01 — SIGNIFICANT CHANGE UP (ref 1.01–1.02)
TSH SERPL-MCNC: 1.62 UU/ML — SIGNIFICANT CHANGE UP (ref 0.34–4.82)
UROBILINOGEN FLD QL: NEGATIVE — SIGNIFICANT CHANGE UP
WBC # BLD: 14.45 K/UL — HIGH (ref 3.8–10.5)
WBC # BLD: 15.21 K/UL — HIGH (ref 3.8–10.5)
WBC # BLD: 19.2 K/UL — HIGH (ref 3.8–10.5)
WBC # FLD AUTO: 14.45 K/UL — HIGH (ref 3.8–10.5)
WBC # FLD AUTO: 15.21 K/UL — HIGH (ref 3.8–10.5)
WBC # FLD AUTO: 19.2 K/UL — HIGH (ref 3.8–10.5)

## 2020-08-23 PROCEDURE — 99222 1ST HOSP IP/OBS MODERATE 55: CPT | Mod: GC

## 2020-08-23 PROCEDURE — 71045 X-RAY EXAM CHEST 1 VIEW: CPT | Mod: 26

## 2020-08-23 PROCEDURE — 99284 EMERGENCY DEPT VISIT MOD MDM: CPT | Mod: 25

## 2020-08-23 PROCEDURE — 27245 TREAT THIGH FRACTURE: CPT | Mod: AS,RT

## 2020-08-23 RX ORDER — ENOXAPARIN SODIUM 100 MG/ML
30 INJECTION SUBCUTANEOUS EVERY 12 HOURS
Refills: 0 | Status: DISCONTINUED | OUTPATIENT
Start: 2020-08-24 | End: 2020-08-24

## 2020-08-23 RX ORDER — SODIUM CHLORIDE 9 MG/ML
1000 INJECTION INTRAMUSCULAR; INTRAVENOUS; SUBCUTANEOUS
Refills: 0 | Status: DISCONTINUED | OUTPATIENT
Start: 2020-08-23 | End: 2020-08-23

## 2020-08-23 RX ORDER — INSULIN LISPRO 100/ML
VIAL (ML) SUBCUTANEOUS
Refills: 0 | Status: DISCONTINUED | OUTPATIENT
Start: 2020-08-23 | End: 2020-08-23

## 2020-08-23 RX ORDER — INSULIN GLARGINE 100 [IU]/ML
10 INJECTION, SOLUTION SUBCUTANEOUS AT BEDTIME
Refills: 0 | Status: DISCONTINUED | OUTPATIENT
Start: 2020-08-23 | End: 2020-08-23

## 2020-08-23 RX ORDER — GLUCAGON INJECTION, SOLUTION 0.5 MG/.1ML
1 INJECTION, SOLUTION SUBCUTANEOUS ONCE
Refills: 0 | Status: DISCONTINUED | OUTPATIENT
Start: 2020-08-23 | End: 2020-08-24

## 2020-08-23 RX ORDER — INSULIN LISPRO 100/ML
VIAL (ML) SUBCUTANEOUS EVERY 6 HOURS
Refills: 0 | Status: DISCONTINUED | OUTPATIENT
Start: 2020-08-23 | End: 2020-08-23

## 2020-08-23 RX ORDER — DEXTROSE 50 % IN WATER 50 %
12.5 SYRINGE (ML) INTRAVENOUS ONCE
Refills: 0 | Status: DISCONTINUED | OUTPATIENT
Start: 2020-08-23 | End: 2020-08-24

## 2020-08-23 RX ORDER — DEXTROSE 50 % IN WATER 50 %
25 SYRINGE (ML) INTRAVENOUS ONCE
Refills: 0 | Status: DISCONTINUED | OUTPATIENT
Start: 2020-08-23 | End: 2020-08-24

## 2020-08-23 RX ORDER — OXYCODONE AND ACETAMINOPHEN 5; 325 MG/1; MG/1
1 TABLET ORAL EVERY 4 HOURS
Refills: 0 | Status: DISCONTINUED | OUTPATIENT
Start: 2020-08-23 | End: 2020-08-23

## 2020-08-23 RX ORDER — MORPHINE SULFATE 50 MG/1
2 CAPSULE, EXTENDED RELEASE ORAL EVERY 4 HOURS
Refills: 0 | Status: DISCONTINUED | OUTPATIENT
Start: 2020-08-23 | End: 2020-08-23

## 2020-08-23 RX ORDER — HEPARIN SODIUM 5000 [USP'U]/ML
5000 INJECTION INTRAVENOUS; SUBCUTANEOUS EVERY 8 HOURS
Refills: 0 | Status: DISCONTINUED | OUTPATIENT
Start: 2020-08-23 | End: 2020-08-23

## 2020-08-23 RX ORDER — ONDANSETRON 8 MG/1
4 TABLET, FILM COATED ORAL EVERY 6 HOURS
Refills: 0 | Status: DISCONTINUED | OUTPATIENT
Start: 2020-08-23 | End: 2020-08-24

## 2020-08-23 RX ORDER — KETOROLAC TROMETHAMINE 30 MG/ML
30 SYRINGE (ML) INJECTION EVERY 6 HOURS
Refills: 0 | Status: DISCONTINUED | OUTPATIENT
Start: 2020-08-23 | End: 2020-08-24

## 2020-08-23 RX ORDER — CEFAZOLIN SODIUM 1 G
2000 VIAL (EA) INJECTION EVERY 8 HOURS
Refills: 0 | Status: COMPLETED | OUTPATIENT
Start: 2020-08-23 | End: 2020-08-24

## 2020-08-23 RX ORDER — INSULIN LISPRO 100/ML
VIAL (ML) SUBCUTANEOUS
Refills: 0 | Status: DISCONTINUED | OUTPATIENT
Start: 2020-08-23 | End: 2020-08-24

## 2020-08-23 RX ORDER — INSULIN LISPRO 100/ML
VIAL (ML) SUBCUTANEOUS AT BEDTIME
Refills: 0 | Status: DISCONTINUED | OUTPATIENT
Start: 2020-08-23 | End: 2020-08-24

## 2020-08-23 RX ORDER — DEXTROSE 50 % IN WATER 50 %
12.5 SYRINGE (ML) INTRAVENOUS ONCE
Refills: 0 | Status: DISCONTINUED | OUTPATIENT
Start: 2020-08-23 | End: 2020-08-23

## 2020-08-23 RX ORDER — SENNA PLUS 8.6 MG/1
2 TABLET ORAL AT BEDTIME
Refills: 0 | Status: DISCONTINUED | OUTPATIENT
Start: 2020-08-23 | End: 2020-08-24

## 2020-08-23 RX ORDER — MAGNESIUM HYDROXIDE 400 MG/1
30 TABLET, CHEWABLE ORAL DAILY
Refills: 0 | Status: DISCONTINUED | OUTPATIENT
Start: 2020-08-23 | End: 2020-08-24

## 2020-08-23 RX ORDER — DEXTROSE 50 % IN WATER 50 %
15 SYRINGE (ML) INTRAVENOUS ONCE
Refills: 0 | Status: DISCONTINUED | OUTPATIENT
Start: 2020-08-23 | End: 2020-08-24

## 2020-08-23 RX ORDER — SODIUM CHLORIDE 9 MG/ML
1000 INJECTION, SOLUTION INTRAVENOUS
Refills: 0 | Status: DISCONTINUED | OUTPATIENT
Start: 2020-08-23 | End: 2020-08-23

## 2020-08-23 RX ORDER — INSULIN GLARGINE 100 [IU]/ML
15 INJECTION, SOLUTION SUBCUTANEOUS AT BEDTIME
Refills: 0 | Status: DISCONTINUED | OUTPATIENT
Start: 2020-08-23 | End: 2020-08-24

## 2020-08-23 RX ORDER — OXYCODONE HYDROCHLORIDE 5 MG/1
5 TABLET ORAL EVERY 4 HOURS
Refills: 0 | Status: DISCONTINUED | OUTPATIENT
Start: 2020-08-23 | End: 2020-08-24

## 2020-08-23 RX ORDER — ACETAMINOPHEN 500 MG
650 TABLET ORAL EVERY 4 HOURS
Refills: 0 | Status: DISCONTINUED | OUTPATIENT
Start: 2020-08-23 | End: 2020-08-23

## 2020-08-23 RX ORDER — MORPHINE SULFATE 50 MG/1
2 CAPSULE, EXTENDED RELEASE ORAL ONCE
Refills: 0 | Status: DISCONTINUED | OUTPATIENT
Start: 2020-08-23 | End: 2020-08-23

## 2020-08-23 RX ORDER — SODIUM CHLORIDE 9 MG/ML
1000 INJECTION, SOLUTION INTRAVENOUS
Refills: 0 | Status: DISCONTINUED | OUTPATIENT
Start: 2020-08-23 | End: 2020-08-24

## 2020-08-23 RX ORDER — SODIUM CHLORIDE 9 MG/ML
1000 INJECTION INTRAMUSCULAR; INTRAVENOUS; SUBCUTANEOUS
Refills: 0 | Status: DISCONTINUED | OUTPATIENT
Start: 2020-08-23 | End: 2020-08-24

## 2020-08-23 RX ORDER — ACETAMINOPHEN 500 MG
650 TABLET ORAL EVERY 6 HOURS
Refills: 0 | Status: DISCONTINUED | OUTPATIENT
Start: 2020-08-23 | End: 2020-08-24

## 2020-08-23 RX ORDER — POLYETHYLENE GLYCOL 3350 17 G/17G
17 POWDER, FOR SOLUTION ORAL DAILY
Refills: 0 | Status: DISCONTINUED | OUTPATIENT
Start: 2020-08-23 | End: 2020-08-24

## 2020-08-23 RX ORDER — ACETAMINOPHEN 500 MG
1000 TABLET ORAL EVERY 8 HOURS
Refills: 0 | Status: DISCONTINUED | OUTPATIENT
Start: 2020-08-23 | End: 2020-08-24

## 2020-08-23 RX ADMIN — MORPHINE SULFATE 2 MILLIGRAM(S): 50 CAPSULE, EXTENDED RELEASE ORAL at 06:40

## 2020-08-23 RX ADMIN — Medication 650 MILLIGRAM(S): at 06:07

## 2020-08-23 RX ADMIN — MORPHINE SULFATE 2 MILLIGRAM(S): 50 CAPSULE, EXTENDED RELEASE ORAL at 06:07

## 2020-08-23 RX ADMIN — INSULIN GLARGINE 15 UNIT(S): 100 INJECTION, SOLUTION SUBCUTANEOUS at 23:51

## 2020-08-23 RX ADMIN — Medication 4: at 16:46

## 2020-08-23 RX ADMIN — Medication 4: at 06:05

## 2020-08-23 RX ADMIN — Medication 4: at 03:55

## 2020-08-23 RX ADMIN — HEPARIN SODIUM 5000 UNIT(S): 5000 INJECTION INTRAVENOUS; SUBCUTANEOUS at 06:05

## 2020-08-23 RX ADMIN — Medication 1000 MILLIGRAM(S): at 22:17

## 2020-08-23 RX ADMIN — MORPHINE SULFATE 2 MILLIGRAM(S): 50 CAPSULE, EXTENDED RELEASE ORAL at 01:50

## 2020-08-23 RX ADMIN — INSULIN GLARGINE 10 UNIT(S): 100 INJECTION, SOLUTION SUBCUTANEOUS at 03:47

## 2020-08-23 RX ADMIN — Medication 100 MILLIGRAM(S): at 17:33

## 2020-08-23 RX ADMIN — MORPHINE SULFATE 2 MILLIGRAM(S): 50 CAPSULE, EXTENDED RELEASE ORAL at 00:45

## 2020-08-23 RX ADMIN — Medication 650 MILLIGRAM(S): at 07:41

## 2020-08-23 RX ADMIN — SENNA PLUS 2 TABLET(S): 8.6 TABLET ORAL at 22:18

## 2020-08-23 NOTE — H&P ADULT - PROBLEM SELECTOR PLAN 5
Pt noted with elevated bilirubin and mild elevation in LFT's  Pt denies any abdominal pain, no tenderness on palpation  Consider USG abdomen if persistent elevation in liver function

## 2020-08-23 NOTE — CHART NOTE - NSCHARTNOTEFT_GEN_A_CORE
Notified by RN that pt is refusing 10 units of Lantus stating the dose is not correct for him. Spoke with the pt at bedside who reports being on NPH 24 units in am and 15 units at bedtime reports FS in 300s since yesterday , received 10 units Lantus last night despite which FS in 300s all day. Request 15 units Lantus to be given.   Chart reviewed , pt presented with hyperglycemia hence started on 10 units Lantus, trend noted maintaining around 300 last . Will increase the dose of Lantus to 15 unit and switch sliding scale to achs.

## 2020-08-23 NOTE — H&P ADULT - PROBLEM SELECTOR PLAN 4
Pt noted with elevated white count with no signs of infection  Likely reactionary from fracture  Monitor for signs of infection  CXR negative for any infiltrate, Covid PCR negative, F/u UA

## 2020-08-23 NOTE — H&P ADULT - PROBLEM SELECTOR PLAN 1
Pt p/w fall, found to have right sided hip fracture  Ortho Dr Marques consulted by ED, f/u recommendations  Will keep patient NPO for possible procedure  c/w pain medications  Pt denies any history of heart disease, HTN. METS>4

## 2020-08-23 NOTE — H&P ADULT - ASSESSMENT
Pt is a 60 year old male from home, lives alone with PMHx of IDDM who presented to ED s/p fall. Pt states that he was out on a walk when he felt hypoglycemic symptoms, he ate some chocolate and then started running again once his symptoms improved but then he felt weak and had a fall. Pt states that he tried to get up but couldn't do so due to pain. 911 was called and patient was brought to hospital. As per EMS, BSL was 60's, patient was given oral glucose and sugar improved to 80's. Pt had multiple ED visits in past due to severe hypoglycemia and syncopal episodes. Pt states that he is physically very active and walks for hours, takes Insulin 70/30 24 units in morning and 15 at night. Pt denies any cough, fever, sob, chest pain, abd pain, nausea, vomiting or diarrhea. In ED, Pt underwent imaging studies and was found to have right hip fracture. Pt is being admitted for Hip fracture requiring surgical intervention.

## 2020-08-23 NOTE — CONSULT NOTE ADULT - SUBJECTIVE AND OBJECTIVE BOX
HPI:  Pt is a 60 year old male from home, lives alone with PMHx of IDDM who presented to ED s/p fall. Pt states that he was out on a walk when he felt hypoglycemic symptoms, he ate some chocolate and then started running again once his symptoms improved but then he felt weak and had a fall. Pt states that he tried to get up but couldn't do so due to pain. 911 was called and patient was brought to hospital. As per EMS, BSL was 60's, patient was given oral glucose and sugar improved to 80's. Pt had multiple ED visits in past due to severe hypoglycemia and syncopal episodes. Pt states that he is physically very active and walks for hours, takes Insulin 70/30 24 units in morning and 15 at night. Pt denies any cough, fever, sob, chest pain, abd pain, nausea, vomiting or diarrhea. In ED, Pt underwent imaging studies and was found to have right hip fracture. (23 Aug 2020 03:25)      PAST MEDICAL & SURGICAL HISTORY:  Diabetes  No significant past surgical history      MEDICATIONS  (STANDING):  dextrose 5%. 1000 milliLiter(s) (50 mL/Hr) IV Continuous <Continuous>  dextrose 50% Injectable 12.5 Gram(s) IV Push once  heparin   Injectable 5000 Unit(s) SubCutaneous every 8 hours  insulin glargine Injectable (LANTUS) 10 Unit(s) SubCutaneous at bedtime  insulin lispro (HumaLOG) corrective regimen sliding scale   SubCutaneous every 6 hours    MEDICATIONS  (PRN):  acetaminophen   Tablet .. 650 milliGRAM(s) Oral every 4 hours PRN Temp greater or equal to 38C (100.4F), Mild Pain (1 - 3)  morphine  - Injectable 2 milliGRAM(s) IV Push every 4 hours PRN Severe Pain (7 - 10)  oxycodone    5 mG/acetaminophen 325 mG 1 Tablet(s) Oral every 4 hours PRN Moderate Pain (4 - 6)      Allergies    No Known Allergies    Intolerances        Vital Signs Last 24 Hrs  T(C): 37.9 (23 Aug 2020 05:54), Max: 37.9 (23 Aug 2020 05:54)  T(F): 100.3 (23 Aug 2020 05:54), Max: 100.3 (23 Aug 2020 05:54)  HR: 61 (23 Aug 2020 05:54) (56 - 66)  BP: 139/63 (23 Aug 2020 05:54) (137/71 - 155/79)  BP(mean): --  RR: 18 (23 Aug 2020 05:54) (16 - 18)  SpO2: 93% (23 Aug 2020 05:54) (93% - 98%)    Physical:  Gen: A&O x3. NAD  Right LE: Leg is shortened and externally rotated. + distal pulses. NVI.  No ecchymosis over hip.    LABS:                        13.5   14.45 )-----------( 165      ( 23 Aug 2020 06:54 )             41.3     08-23    135  |  100  |  14  ----------------------------<  337<H>  4.6   |  29  |  1.13    Ca    8.9      23 Aug 2020 06:54  Phos  2.9     0823  Mg     2.2     0823    TPro  6.9  /  Alb  3.3<L>  /  TBili  2.5<H>  /  DBili  x   /  AST  56<H>  /  ALT  61<H>  /  AlkPhos  107  08-23    PT/INR - ( 23 Aug 2020 00:44 )   PT: 12.9 sec;   INR: 1.11 ratio         PTT - ( 23 Aug 2020 00:44 )  PTT:30.6 sec  Urinalysis Basic - ( 23 Aug 2020 01:42 )    Color: Yellow / Appearance: Clear / S.010 / pH: x  Gluc: x / Ketone: Negative  / Bili: Negative / Urobili: Negative   Blood: x / Protein: 15 / Nitrite: Negative   Leuk Esterase: Negative / RBC: 0-2 /HPF / WBC 0-2 /HPF   Sq Epi: x / Non Sq Epi: Few /HPF / Bacteria: Few /HPF        RADIOLOGY: Right hip Fracture

## 2020-08-23 NOTE — H&P ADULT - NSHPPHYSICALEXAM_GEN_ALL_CORE
PHYSICAL EXAM:  GENERAL: Pt lying on bed, appears comfortable  HEAD:  Atraumatic, Normocephalic,   EYES: EOMI, PERRLA, conjunctiva and sclera clear  NECK: Supple, No JVD  CHEST/LUNG: Clear to auscultation bilaterally; No wheeze; No crackles; No accessory muscles used  HEART: Regular rate and rhythm; No murmurs;   ABDOMEN: Soft, Nontender, Nondistended; Bowel sounds present; No guarding  EXTREMITIES:  2+ Peripheral Pulses, RLE Rotated and shorter in left, + pulses  PSYCH: AAOx3  NEUROLOGY: non-focal  SKIN: No rashes or lesions

## 2020-08-23 NOTE — H&P ADULT - ATTENDING COMMENTS
I have seen and examined the patient. I agree with above note, it reflect my personal involvement with patient care with my independent comments below. Patient is a 60 M with PMH of DM (on NPH insulin 24 Units in morning and 15 units before dinner) was brought to ED because of pain in pelvis. Patient took insulin in morning. Before dinner time he went to running and felt like his sugar is dropping; he ate snack but later on synopsized. Per patient, when EMS came, patient was hypo to 60's.   Labs are positive for leucocytosis, that could be reactive to pain, blood sugar of 268. UA is negative for infection, positive for proteinuria.   Pelvis Xray showed Right hip fracture.   Patient is admitted for   # Right hip fracture requiring surgical intervention.  # DM-2 with hyperglycaemia  # Hypoglycemia  # Leucocytosis with left shift, likely reactive  # Transaminitis    Plan  -Ortho consult and surgery per recommendation  -Pain medication tylenol/percocet/morphine  -Lantus 10 Units (started low dose given hypoglycemia on home dose (home dose is NPH 24-Am, 15 before dinner)  -Repeat CBC in morning  -Repeat hepatic function in morning, if still transaminitis consider further work up. I have seen and examined the patient. I agree with above note, it reflect my personal involvement with patient care with my independent comments below. Patient is a 60 M with PMH of DM (on NPH insulin 24 Units in morning and 15 units before dinner) was brought to ED because of pain in pelvis. Patient took insulin in morning. Before dinner time he went to running and felt like his sugar is dropping; he ate snack but later on synopsized. Per patient, when EMS came, patient was hypo to 60's.   Labs are positive for leucocytosis, that could be reactive to pain, blood sugar of 268. UA is negative for infection, positive for proteinuria.   Pelvis Xray showed Right hip fracture.   Patient is admitted for   # Right hip fracture requiring surgical intervention.  # DM-2 with hyperglycaemia  # Hypoglycemia  # Leucocytosis with left shift, likely reactive  # Transaminitis    Plan  -Ortho consult and surgery per recommendation. Plan right hip ORIF today.  -Pain medication tylenol/percocet/morphine  -Lantus 10 Units (started low dose given hypoglycemia on home dose (home dose is NPH 24-Am, 15 before dinner)  -Repeat CBC in morning  -Repeat hepatic function in morning, if still transaminitis consider further work up.    Patient is a healthy gentlemen, runs 1-2 miles routine basis, METS more than 4. RCRI score 1 (Class II) indicated 6 percent risk of 30 day MI/cardiac arrest/stroke. Patient is medically optimize for R hip ORIF today with moderate risk, no further work up needed at this point.

## 2020-08-23 NOTE — BRIEF OPERATIVE NOTE - NSICDXBRIEFPREOP_GEN_ALL_CORE_FT
PRE-OP DIAGNOSIS:  Closed fracture of right hip requiring operative repair, initial encounter 23-Aug-2020 12:56:00  Donavan Martin

## 2020-08-23 NOTE — H&P ADULT - HISTORY OF PRESENT ILLNESS
60 year old male with PMHx of DM presenting s/p fall today. Patient took medication this evening and felt his sugar become low, he had some snacks in his pocket which he ate and thought would help him make it home, however, he was unable to make it home and fell to the ground. Patient currently complaining of right thigh pain. Denies LOC, head trauma, nausea, vomiting, or any other complaints. Patient was given food by EMS and in the ED patient currently feeling back to baseline.    INCOMPLETE Pt is a 60 year old male from home, lives alone with PMHx of IDDM who presented to ED s/p fall. Pt states that he was out on a walk when he felt hypoglycemic symptoms, he ate some chocolate and then started running again once his symptoms improved but then he felt weak and had a fall. Pt states that he tried to get up but couldn't do so due to pain. 911 was called and patient was brought to hospital. As per EMS, BSL was 60's, patient was given oral glucose and sugar improved to 80's. Pt had multiple ED visits in past due to severe hypoglycemia and syncopal episodes. Pt states that he is physically very active and walks for hours, takes Insulin 70/30 24 units in morning and 15 at night. Pt denies any cough, fever, sob, chest pain, abd pain, nausea, vomiting or diarrhea. In ED, Pt underwent imaging studies and was found to have right hip fracture.

## 2020-08-23 NOTE — BRIEF OPERATIVE NOTE - NSICDXBRIEFPOSTOP_GEN_ALL_CORE_FT
POST-OP DIAGNOSIS:  Closed fracture of right hip requiring operative repair, initial encounter 23-Aug-2020 12:56:18  Donavan Martin

## 2020-08-23 NOTE — BRIEF OPERATIVE NOTE - NSICDXBRIEFPROCEDURE_GEN_ALL_CORE_FT
PROCEDURES:  Intramedullary nailing of right femur 23-Aug-2020 12:53:52 Right Hip Intertrochanteric Fracture Donavan Martin

## 2020-08-23 NOTE — H&P ADULT - PROBLEM SELECTOR PLAN 3
Pt with known H/o IDDM on Insulin 70/30 24 units in am and 15 at bedtime  Pt noted with multiple epsiodes of hypoglycemia in past  Will start on lantus 10 units as Pt is NPO  C/w sliding scale   F/u Hba1c in am( Last known 7 as per patient)

## 2020-08-24 VITALS — DIASTOLIC BLOOD PRESSURE: 77 MMHG | HEART RATE: 71 BPM | SYSTOLIC BLOOD PRESSURE: 144 MMHG | OXYGEN SATURATION: 95 %

## 2020-08-24 DIAGNOSIS — M25.559 PAIN IN UNSPECIFIED HIP: ICD-10-CM

## 2020-08-24 LAB
A1C WITH ESTIMATED AVERAGE GLUCOSE RESULT: 7 % — HIGH (ref 4–5.6)
ALBUMIN SERPL ELPH-MCNC: 2.6 G/DL — LOW (ref 3.5–5)
ALP SERPL-CCNC: 87 U/L — SIGNIFICANT CHANGE UP (ref 40–120)
ALT FLD-CCNC: 40 U/L DA — SIGNIFICANT CHANGE UP (ref 10–60)
ANION GAP SERPL CALC-SCNC: 5 MMOL/L — SIGNIFICANT CHANGE UP (ref 5–17)
AST SERPL-CCNC: 38 U/L — SIGNIFICANT CHANGE UP (ref 10–40)
BASOPHILS # BLD AUTO: 0.1 K/UL — SIGNIFICANT CHANGE UP (ref 0–0.2)
BASOPHILS NFR BLD AUTO: 1.1 % — SIGNIFICANT CHANGE UP (ref 0–2)
BILIRUB DIRECT SERPL-MCNC: 0.3 MG/DL — HIGH (ref 0–0.2)
BILIRUB SERPL-MCNC: 3.5 MG/DL — HIGH (ref 0.2–1.2)
BUN SERPL-MCNC: 18 MG/DL — SIGNIFICANT CHANGE UP (ref 7–18)
CALCIUM SERPL-MCNC: 8 MG/DL — LOW (ref 8.4–10.5)
CHLORIDE SERPL-SCNC: 102 MMOL/L — SIGNIFICANT CHANGE UP (ref 96–108)
CO2 SERPL-SCNC: 28 MMOL/L — SIGNIFICANT CHANGE UP (ref 22–31)
CREAT SERPL-MCNC: 0.9 MG/DL — SIGNIFICANT CHANGE UP (ref 0.5–1.3)
EOSINOPHIL # BLD AUTO: 0.33 K/UL — SIGNIFICANT CHANGE UP (ref 0–0.5)
EOSINOPHIL NFR BLD AUTO: 3.5 % — SIGNIFICANT CHANGE UP (ref 0–6)
ESTIMATED AVERAGE GLUCOSE: 154 MG/DL — HIGH (ref 68–114)
GLUCOSE BLDC GLUCOMTR-MCNC: 179 MG/DL — HIGH (ref 70–99)
GLUCOSE BLDC GLUCOMTR-MCNC: 241 MG/DL — HIGH (ref 70–99)
GLUCOSE BLDC GLUCOMTR-MCNC: 249 MG/DL — HIGH (ref 70–99)
GLUCOSE BLDC GLUCOMTR-MCNC: 298 MG/DL — HIGH (ref 70–99)
GLUCOSE SERPL-MCNC: 222 MG/DL — HIGH (ref 70–99)
HCT VFR BLD CALC: 34.6 % — LOW (ref 39–50)
HCV AB S/CO SERPL IA: 0.24 S/CO — SIGNIFICANT CHANGE UP (ref 0–0.99)
HCV AB SERPL-IMP: SIGNIFICANT CHANGE UP
HGB BLD-MCNC: 11.9 G/DL — LOW (ref 13–17)
IMM GRANULOCYTES NFR BLD AUTO: 0.4 % — SIGNIFICANT CHANGE UP (ref 0–1.5)
LYMPHOCYTES # BLD AUTO: 0.97 K/UL — LOW (ref 1–3.3)
LYMPHOCYTES # BLD AUTO: 10.3 % — LOW (ref 13–44)
MAGNESIUM SERPL-MCNC: 2 MG/DL — SIGNIFICANT CHANGE UP (ref 1.6–2.6)
MCHC RBC-ENTMCNC: 30.6 PG — SIGNIFICANT CHANGE UP (ref 27–34)
MCHC RBC-ENTMCNC: 34.4 GM/DL — SIGNIFICANT CHANGE UP (ref 32–36)
MCV RBC AUTO: 88.9 FL — SIGNIFICANT CHANGE UP (ref 80–100)
MONOCYTES # BLD AUTO: 0.92 K/UL — HIGH (ref 0–0.9)
MONOCYTES NFR BLD AUTO: 9.7 % — SIGNIFICANT CHANGE UP (ref 2–14)
NEUTROPHILS # BLD AUTO: 7.09 K/UL — SIGNIFICANT CHANGE UP (ref 1.8–7.4)
NEUTROPHILS NFR BLD AUTO: 75 % — SIGNIFICANT CHANGE UP (ref 43–77)
NRBC # BLD: 0 /100 WBCS — SIGNIFICANT CHANGE UP (ref 0–0)
PHOSPHATE SERPL-MCNC: 2.6 MG/DL — SIGNIFICANT CHANGE UP (ref 2.5–4.5)
PLATELET # BLD AUTO: 130 K/UL — LOW (ref 150–400)
POTASSIUM SERPL-MCNC: 4.1 MMOL/L — SIGNIFICANT CHANGE UP (ref 3.5–5.3)
POTASSIUM SERPL-SCNC: 4.1 MMOL/L — SIGNIFICANT CHANGE UP (ref 3.5–5.3)
PROT SERPL-MCNC: 6 G/DL — SIGNIFICANT CHANGE UP (ref 6–8.3)
RBC # BLD: 3.89 M/UL — LOW (ref 4.2–5.8)
RBC # FLD: 12.8 % — SIGNIFICANT CHANGE UP (ref 10.3–14.5)
SODIUM SERPL-SCNC: 135 MMOL/L — SIGNIFICANT CHANGE UP (ref 135–145)
WBC # BLD: 9.45 K/UL — SIGNIFICANT CHANGE UP (ref 3.8–10.5)
WBC # FLD AUTO: 9.45 K/UL — SIGNIFICANT CHANGE UP (ref 3.8–10.5)

## 2020-08-24 PROCEDURE — 76000 FLUOROSCOPY <1 HR PHYS/QHP: CPT

## 2020-08-24 PROCEDURE — 99285 EMERGENCY DEPT VISIT HI MDM: CPT

## 2020-08-24 PROCEDURE — 99222 1ST HOSP IP/OBS MODERATE 55: CPT

## 2020-08-24 PROCEDURE — 83735 ASSAY OF MAGNESIUM: CPT

## 2020-08-24 PROCEDURE — 86769 SARS-COV-2 COVID-19 ANTIBODY: CPT

## 2020-08-24 PROCEDURE — 93005 ELECTROCARDIOGRAM TRACING: CPT

## 2020-08-24 PROCEDURE — 72170 X-RAY EXAM OF PELVIS: CPT

## 2020-08-24 PROCEDURE — 82306 VITAMIN D 25 HYDROXY: CPT

## 2020-08-24 PROCEDURE — 96374 THER/PROPH/DIAG INJ IV PUSH: CPT

## 2020-08-24 PROCEDURE — C1889: CPT

## 2020-08-24 PROCEDURE — 83036 HEMOGLOBIN GLYCOSYLATED A1C: CPT

## 2020-08-24 PROCEDURE — 36415 COLL VENOUS BLD VENIPUNCTURE: CPT

## 2020-08-24 PROCEDURE — 87635 SARS-COV-2 COVID-19 AMP PRB: CPT

## 2020-08-24 PROCEDURE — 86900 BLOOD TYPING SEROLOGIC ABO: CPT

## 2020-08-24 PROCEDURE — 76705 ECHO EXAM OF ABDOMEN: CPT | Mod: 26

## 2020-08-24 PROCEDURE — 82248 BILIRUBIN DIRECT: CPT

## 2020-08-24 PROCEDURE — C1713: CPT

## 2020-08-24 PROCEDURE — 96375 TX/PRO/DX INJ NEW DRUG ADDON: CPT

## 2020-08-24 PROCEDURE — 80053 COMPREHEN METABOLIC PANEL: CPT

## 2020-08-24 PROCEDURE — 84443 ASSAY THYROID STIM HORMONE: CPT

## 2020-08-24 PROCEDURE — 80048 BASIC METABOLIC PNL TOTAL CA: CPT

## 2020-08-24 PROCEDURE — C1769: CPT

## 2020-08-24 PROCEDURE — 86803 HEPATITIS C AB TEST: CPT

## 2020-08-24 PROCEDURE — 84100 ASSAY OF PHOSPHORUS: CPT

## 2020-08-24 PROCEDURE — 85027 COMPLETE CBC AUTOMATED: CPT

## 2020-08-24 PROCEDURE — 82962 GLUCOSE BLOOD TEST: CPT

## 2020-08-24 PROCEDURE — 97163 PT EVAL HIGH COMPLEX 45 MIN: CPT

## 2020-08-24 PROCEDURE — 85730 THROMBOPLASTIN TIME PARTIAL: CPT

## 2020-08-24 PROCEDURE — 73552 X-RAY EXAM OF FEMUR 2/>: CPT

## 2020-08-24 PROCEDURE — 86923 COMPATIBILITY TEST ELECTRIC: CPT

## 2020-08-24 PROCEDURE — 85610 PROTHROMBIN TIME: CPT

## 2020-08-24 PROCEDURE — 71045 X-RAY EXAM CHEST 1 VIEW: CPT

## 2020-08-24 PROCEDURE — 86850 RBC ANTIBODY SCREEN: CPT

## 2020-08-24 PROCEDURE — 76705 ECHO EXAM OF ABDOMEN: CPT

## 2020-08-24 PROCEDURE — 81001 URINALYSIS AUTO W/SCOPE: CPT

## 2020-08-24 PROCEDURE — 86901 BLOOD TYPING SEROLOGIC RH(D): CPT

## 2020-08-24 RX ORDER — POLYETHYLENE GLYCOL 3350 17 G/17G
17 POWDER, FOR SOLUTION ORAL
Qty: 0 | Refills: 0 | DISCHARGE
Start: 2020-08-24

## 2020-08-24 RX ORDER — OXYCODONE HYDROCHLORIDE 5 MG/1
1 TABLET ORAL
Qty: 28 | Refills: 0
Start: 2020-08-24 | End: 2020-08-30

## 2020-08-24 RX ORDER — SENNA PLUS 8.6 MG/1
2 TABLET ORAL
Qty: 20 | Refills: 0
Start: 2020-08-24 | End: 2020-09-02

## 2020-08-24 RX ORDER — MAGNESIUM HYDROXIDE 400 MG/1
30 TABLET, CHEWABLE ORAL
Qty: 0 | Refills: 0 | DISCHARGE
Start: 2020-08-24

## 2020-08-24 RX ORDER — APIXABAN 2.5 MG/1
1 TABLET, FILM COATED ORAL
Qty: 28 | Refills: 0
Start: 2020-08-24 | End: 2020-09-06

## 2020-08-24 RX ORDER — CEPHALEXIN 500 MG
1 CAPSULE ORAL
Qty: 20 | Refills: 0
Start: 2020-08-24 | End: 2020-08-28

## 2020-08-24 RX ADMIN — Medication 1000 MILLIGRAM(S): at 05:09

## 2020-08-24 RX ADMIN — Medication 1000 MILLIGRAM(S): at 01:59

## 2020-08-24 RX ADMIN — Medication 30 MILLIGRAM(S): at 16:25

## 2020-08-24 RX ADMIN — Medication 1000 MILLIGRAM(S): at 05:51

## 2020-08-24 RX ADMIN — ENOXAPARIN SODIUM 30 MILLIGRAM(S): 100 INJECTION SUBCUTANEOUS at 12:04

## 2020-08-24 RX ADMIN — Medication 30 MILLIGRAM(S): at 16:10

## 2020-08-24 RX ADMIN — ENOXAPARIN SODIUM 30 MILLIGRAM(S): 100 INJECTION SUBCUTANEOUS at 02:14

## 2020-08-24 RX ADMIN — Medication 100 MILLIGRAM(S): at 02:14

## 2020-08-24 RX ADMIN — Medication 2: at 07:54

## 2020-08-24 RX ADMIN — POLYETHYLENE GLYCOL 3350 17 GRAM(S): 17 POWDER, FOR SOLUTION ORAL at 12:04

## 2020-08-24 RX ADMIN — Medication 1: at 12:03

## 2020-08-24 RX ADMIN — Medication 2: at 17:15

## 2020-08-24 NOTE — DISCHARGE NOTE PROVIDER - CARE PROVIDER_API CALL
Yossi Marques  ORTHOPAEDIC SURGERY  [address] [place] [address]  Phone: [phone]  Fax: [phone]  Follow Up Time: 2 weeks

## 2020-08-24 NOTE — PROGRESS NOTE ADULT - ASSESSMENT
61 y/o Male S/p R Hip IM Nailing POD# 1
Assessment & Plan:  60yMale with Right Hip Intertrochanteric Fracture  -case d/w Dr. Marques, Dr. Oneal  -For OR today, 08/23/2020  -Dante Gamma Nail requested, rep aware

## 2020-08-24 NOTE — DISCHARGE NOTE PROVIDER - HOSPITAL COURSE
60 year old male from home, lives alone with PMHx of IDDM who presented to ED s/p fall. Pt states that he was out on a walk when he felt hypoglycemic symptoms, he ate some chocolate and then started running again once his symptoms improved but then he felt weak and had a fall. Pt states that he tried to get up but couldn't do so due to pain. 911 was called and patient was brought to hospital. As per EMS, BSL was 60's, patient was given oral glucose and sugar improved to 80's. Pt had multiple ED visits in past due to severe hypoglycemia and syncopal episodes. Pt states that he is physically very active and walks for hours, takes Insulin 70/30 24 units in morning and 15 at night. Pt denies any cough, fever, sob, chest pain, abd pain, nausea, vomiting or diarrhea. In ED, Pt underwent imaging studies and was found to have right hip fracture. Underwent right hip intramedullary  nailing on Aug 23. Tolerated procedure well. Patient had fever spike on POD1. Also found to have hyperbilirubinemia. Patient was adamant to leave on POD 1. Did not want to do any work up. Wants to follow up with PCP. Patient was counselled in detail about dangers of hypoglycemia, reports it only occurs when he exercises. Counselled about snacks before exercise. Will follow up with primary endocrinologist.     Will be discharged on Eliquis 2.5mg BID for DVT ppx.         Patient will leave AMA if its before  24hour he is fever free.

## 2020-08-24 NOTE — PHYSICAL THERAPY INITIAL EVALUATION ADULT - CRITERIA FOR SKILLED THERAPEUTIC INTERVENTIONS
impairments found/functional limitations in following categories/therapy frequency/predicted duration of therapy intervention/rehab potential/anticipated equipment needs at discharge/anticipated discharge recommendation/risk reduction/prevention

## 2020-08-24 NOTE — CHART NOTE - NSCHARTNOTEFT_GEN_A_CORE
Notified by the nurse and Dr. Oneal- pt refuses to stay and is leaving AMA. I had a lengthy discussion with the patient about the risks of leaving AMA in his condition(post op fever, further PT, hyperglycemia)-included but not limited to- infection, fall, periprosthetic fracture, hypoglycemic event, syncope, worsening pain, PE, DVT and possibly death. Pt is still adamant about leaving- he is refusing to stay overnight for further care/management.  I discussed at length with the patient about the medications that he should be taking and he should followup with Dr. Marques THIS week. Regarding the medications- the patient is to take percocet for pain control and Eliquis for DVT ppx- [facility]- it is currently in the patients possessions. I discussed with the patient regarding waiting for the Keflex that is recommended by Dr. Marques- although patient refuses to wait- and states he will pick it up from the [facility] on [address] [facility] [phone])- I called and confirmed that the medication is available and with discounts it is $13(patient does not have insurance)- pt is aware and states he will  the keflex today. Once again I discussed with the patient the importance of staying overnight for further management and reiterated the risks of leaving AMA- pt understood and still refuses to stay. Dr. Marques aware.

## 2020-08-24 NOTE — PHYSICAL THERAPY INITIAL EVALUATION ADULT - PERSONAL SAFETY AND JUDGMENT, REHAB EVAL
pt adamant about returning home despite his dependent state of mobility/at risk behaviors demonstrated

## 2020-08-24 NOTE — PHYSICAL THERAPY INITIAL EVALUATION ADULT - ACTIVE RANGE OF MOTION EXAMINATION, REHAB EVAL
eliza. upper extremity Active ROM was WNL (within normal limits)/RLE Active ROM was WNL (within normal limits)

## 2020-08-24 NOTE — CHART NOTE - NSCHARTNOTEFT_GEN_A_CORE
Was informed by the nurse that the patient wanted to leave AMA. Patient was seen and examined at bedside. Patient is AAOx3, reports he does not want to stay in hospital and wants to take NPH insulin, will take 15Units now and 15units before going to bed. Explained to him about risk of hypoglycemia and possible death. He promised wont take any insulin if sugar is below 200 and decrease the dose if below 400. He knows the duration of action of NPH insulin, and reports he will eat in between and check his FS every few hours to prevent hypoglycemia. "Believe me I am doing it for 40 years" he says. Explained in great detail the risks of refusing medical treatment & departing against medical advice. Patient verbalized understanding of the risks of leaving. Patient was adamant to leave even after understanding the risks of leaving AMA. Patient signed out AMA. Advised to return to ER if any fever, worsening of pain, difficulty in controlling blood sugar or any signs of bleeding. Will follow up with his PCP and endocrinologist.

## 2020-08-24 NOTE — PHYSICAL THERAPY INITIAL EVALUATION ADULT - RANGE OF MOTION EXAMINATION, REHAB EVAL
AA ROM of right hip to 40 deg flexion; AAROM of right hip abd to 10 deg,; AAROM of right knee to 45 deg

## 2020-08-24 NOTE — DISCHARGE NOTE PROVIDER - NSDCPNSUBOBJ_GEN_ALL_CORE
Patient is a 60y old  Male who presents with a chief complaint of Hypoglycemia and Fall (24 Aug 2020 12:56)        Patient was seen and examined at bedside     Denies any complains except pain     Had fever spike last night         INTERVAL HPI/OVERNIGHT EVENTS:    T(C): 36.8 (20 @ 13:06), Max: 38.7 (20 @ 21:02)    HR: 71 (20 @ 16:39) (61 - 71)    BP: 144/77 (20 @ 16:39) (142/53 - 152/81)    RR: 17 (20 @ 13:06) (17 - 18)    SpO2: 95% (20 @ 16:39) (94% - 97%)    Wt(kg): --    I&O's Summary        23 Aug 2020 07:01  -  24 Aug 2020 07:00    --------------------------------------------------------    IN: 1800 mL / OUT: 0 mL / NET: 1800 mL                REVIEW OF SYSTEMS: denies fever, chills, SOB, palpitations, chest pain, abdominal pain, nausea, vomiting, diarrhea, constipation, dizziness        MEDICATIONS  (STANDING):    dextrose 5%. 1000 milliLiter(s) (50 mL/Hr) IV Continuous <Continuous>    dextrose 50% Injectable 12.5 Gram(s) IV Push once    dextrose 50% Injectable 25 Gram(s) IV Push once    dextrose 50% Injectable 25 Gram(s) IV Push once    enoxaparin Injectable 30 milliGRAM(s) SubCutaneous every 12 hours    insulin glargine Injectable (LANTUS) 15 Unit(s) SubCutaneous at bedtime    insulin lispro (HumaLOG) corrective regimen sliding scale   SubCutaneous three times a day before meals    insulin lispro (HumaLOG) corrective regimen sliding scale   SubCutaneous at bedtime    polyethylene glycol 3350 17 Gram(s) Oral daily    senna 2 Tablet(s) Oral at bedtime    sodium chloride 0.9%. 1000 milliLiter(s) (100 mL/Hr) IV Continuous <Continuous>        MEDICATIONS  (PRN):    acetaminophen   Tablet .. 650 milliGRAM(s) Oral every 6 hours PRN Temp greater or equal to 38C (100.4F), Moderate Pain (4 - 6)    dextrose 40% Gel 15 Gram(s) Oral once PRN Blood Glucose LESS THAN 70 milliGRAM(s)/deciliter    glucagon  Injectable 1 milliGRAM(s) IntraMuscular once PRN Glucose LESS THAN 70 milligrams/deciliter    ketorolac   Injectable 30 milliGRAM(s) IV Push every 6 hours PRN Severe Pain (7 - 10)    magnesium hydroxide Suspension 30 milliLiter(s) Oral daily PRN Constipation    ondansetron Injectable 4 milliGRAM(s) IV Push every 6 hours PRN Nausea and/or Vomiting    oxyCODONE    IR 5 milliGRAM(s) Oral every 4 hours PRN Moderate Pain (4 - 6)            PHYSICAL EXAM:    GENERAL: NAD    NERVOUS SYSTEM:  Alert & Oriented X3, Good concentration; no focal deficit     CHEST/LUNG: Clear to auscultation bilaterally; No rales, rhonchi, wheezing, or rubs    HEART: Regular rate and rhythm; No murmurs, rubs, or gallops    ABDOMEN: Soft, Nontender, Nondistended; Bowel sounds present    EXTREMITIES: RLE bandage dry and intact,  2+ Peripheral Pulses, No clubbing, cyanosis, or edema    SKIN: No rashes or lesions        LABS:                            11.9     9.45  )-----------( 130      ( 24 Aug 2020 06:59 )               34.6         135  |  102  |  18    ----------------------------<  222<H>    4.1   |  28  |  0.90        Ca    8.0<L>      24 Aug 2020 06:59    Phos  2.6     08-24    Mg     2.0     08-24        TPro  x   /  Alb  x   /  TBili  x   /  DBili  0.3<H>  /  AST  x   /  ALT  x   /  AlkPhos  x   08-24        PT/INR - ( 23 Aug 2020 00:44 )   PT: 12.9 sec;   INR: 1.11 ratio               PTT - ( 23 Aug 2020 00:44 )  PTT:30.6 sec    Urinalysis Basic - ( 23 Aug 2020 01:42 )        Color: Yellow / Appearance: Clear / S.010 / pH: x    Gluc: x / Ketone: Negative  / Bili: Negative / Urobili: Negative     Blood: x / Protein: 15 / Nitrite: Negative     Leuk Esterase: Negative / RBC: 0-2 /HPF / WBC 0-2 /HPF     Sq Epi: x / Non Sq Epi: Few /HPF / Bacteria: Few /HPF            CAPILLARY BLOOD GLUCOSE            POCT Blood Glucose.: 249 mg/dL (24 Aug 2020 16:30)    POCT Blood Glucose.: 179 mg/dL (24 Aug 2020 11:46)    POCT Blood Glucose.: 241 mg/dL (24 Aug 2020 07:18)    POCT Blood Glucose.: 301 mg/dL (23 Aug 2020 23:31)    POCT Blood Glucose.: 313 mg/dL (23 Aug 2020 22:00)        Assessment and plan:    Right hip fracture s/p IM nailing     Type 1 DM on insulin     Hyperbilirubinemia     Post operative fever         Plan:    Pain is well controlled    Patient wont stay to repeat blood work to check Hb, to monitor for fever     Denies any cough     Advised breathing exercise with incentive spirometry     US RUQ neg, low direct bili, advised to follow up with PCP, denies any known liver disease     Had a long discussion about his BS management, says his target BS is 100 and always wants to stay around 100. Reports the hypoglycemia happens only when he exercises. Discussed about snacking before exercise. Need to follow up with Endocrinologist    Advised to come back to ER if worsening of symptoms    Patient left AMA

## 2020-08-24 NOTE — PHYSICAL THERAPY INITIAL EVALUATION ADULT - SKIN WDL, MLM
"Chief Complaint   Patient presents with     UTI       Initial /72  Pulse 82  Temp 97.9  F (36.6  C) (Oral)  Wt 208 lb (94.3 kg)  LMP 12/04/2016 (Exact Date)  SpO2 96%  BMI 35.98 kg/m2 Estimated body mass index is 35.98 kg/(m^2) as calculated from the following:    Height as of 6/21/17: 5' 3.75\" (1.619 m).    Weight as of this encounter: 208 lb (94.3 kg).  Medication Reconciliation: complete     Adrianne Swanson MA  " WDL except

## 2020-08-24 NOTE — CONSULT NOTE ADULT - ASSESSMENT
Patient Search   Multi-Patient Search   Reports   Drug Listing   Designation   My MADELINE Numbers  Data Detail Level: Printer-Friendly View Extended View  Confidential Drug Utilization Report  Search Terms: kvng Brice, 1960Search Date: 08/24/2020 10:49:19 AM  The Drug Utilization Report below displays all of the controlled substance prescriptions, if any, that your patient has filled in the last twelve months. The information displayed on this report is compiled from pharmacy submissions to the Department, and accurately reflects the information as submitted by the pharmacies.    This report was requested by: Tawny Hendrix | Reference #: [id number]    There are no results for the search terms that you entered.

## 2020-08-24 NOTE — DISCHARGE NOTE NURSING/CASE MANAGEMENT/SOCIAL WORK - PATIENT PORTAL LINK FT
You can access the FollowMyHealth Patient Portal offered by [facility] by registering at the following website: [email or web address]. By joining [email or web address]’s FollowMyHealth portal, you will also be able to view your health information using other applications (apps) compatible with our system.

## 2020-08-24 NOTE — DISCHARGE NOTE PROVIDER - NSDCCPTREATMENT_GEN_ALL_CORE_FT
PRINCIPAL PROCEDURE  Procedure: Intramedullary nailing of right femur  Findings and Treatment: Right Hip Intertrochanteric Fracture

## 2020-08-24 NOTE — PHYSICAL THERAPY INITIAL EVALUATION ADULT - DIAGNOSIS, PT EVAL
Pt requires mod assist x1 for bed to sit at edge of bed and transfer from bed to chair. Pt needed waker and mod ax1 for ambulation and therefore since pt is living alone, pt would not be able to safely return home at this poiint

## 2020-08-24 NOTE — PHARMACOTHERAPY INTERVENTION NOTE - COMMENTS
Provided medication counseling for patient receiving medications from [facility].  Explained indications, how to take, and possible side effects. Also provided written material.  Patient showed understanding and had no additional questions.

## 2020-08-24 NOTE — PHYSICAL THERAPY INITIAL EVALUATION ADULT - GENERAL OBSERVATIONS, REHAB EVAL
pt found in bed in NAD, pt alert x4 and cooperative. Pt appears anxious about leaving and going home as soon as possible.

## 2020-08-24 NOTE — DISCHARGE NOTE PROVIDER - NSDCMRMEDTOKEN_GEN_ALL_CORE_FT
Eliquis 2.5 mg oral tablet: 1 tab(s) orally every 12 hours   Humulin N:  subcutaneous   NPH: 20U in morning and 8u before dinner Eliquis 2.5 mg oral tablet: 1 tab(s) orally every 12 hours   Humulin N:  subcutaneous   NPH: 20U in morning and 8u before dinner  magnesium hydroxide 8% oral suspension: 30 milliliter(s) orally once a day, As needed, Constipation  oxyCODONE 5 mg oral tablet: 1 tab(s) orally every 6 hours, As Needed -Moderate Pain (4 - 6) MDD:4 tab  polyethylene glycol 3350 oral powder for reconstitution: 17 gram(s) orally once a day  senna oral tablet: 2 tab(s) orally once a day (at bedtime) Eliquis 2.5 mg oral tablet: 1 tab(s) orally every 12 hours   Humulin N:  subcutaneous   NPH: 20U in morning and 8u before dinner  Keflex 500 mg oral capsule: 1 cap(s) orally 4 times a day MDD:4  magnesium hydroxide 8% oral suspension: 30 milliliter(s) orally once a day, As needed, Constipation  oxyCODONE 5 mg oral tablet: 1 tab(s) orally every 6 hours, As Needed -Moderate Pain (4 - 6) MDD:4 tab  polyethylene glycol 3350 oral powder for reconstitution: 17 gram(s) orally once a day  senna oral tablet: 2 tab(s) orally once a day (at bedtime)

## 2020-08-24 NOTE — CONSULT NOTE ADULT - PROBLEM SELECTOR RECOMMENDATION 9
- pt s/p right IM nail, pod#1. + fevers - Incentive spirometer encouraged.    Mild Pain ( Pain Level 1-3)  -	Non-pharmacological agents such as imagery, relaxation, PT, distraction, ice or heat packs to affected area  Moderate Pain  (Pain Level - 4-6)  -	Oxycodone 5mg q 4 hours prn  Severe pain ( Pain Level 7-10)  -	Toradol 30mg ivp q 6 hours prn  Bowel Regimen   -	Senna 2tabs po q hs  -	Miralax 17g po daily – hold for loose stool  PT/OOB  - Elevated TB - 3.5.  recommend liver sonogram and hep panel - pt s/p right IM nail, pod#1. + fevers - Incentive spirometer encouraged.    Mild Pain ( Pain Level 1-3)  -	Non-pharmacological agents such as imagery, relaxation, PT, distraction, ice or heat packs to affected area  Moderate Pain  (Pain Level - 4-6)  -	Oxycodone 5mg q 4 hours prn  Severe pain ( Pain Level 7-10)  -	Toradol 30mg ivp q 6 hours prn  Bowel Regimen   -	Senna 2tabs po q hs  -	Miralax 17g po daily – hold for loose stool  PT/OOB  - Elevated TB - 3.5.  recommend liver sonogram and hep panel.  Spoke to pt at length regarding liver enzymes and elevated temp.  Pt is still adamant about being discharged home today despite not being fever free for 24 hours.  Pt is aware of risks and willing to leave AMA.

## 2020-08-24 NOTE — PROGRESS NOTE ADULT - SUBJECTIVE AND OBJECTIVE BOX
60yMale    Diagnosis:  S/p R Hip IM Nailing POD# 1    Patient was seen and evaluated at bedside. Patient with no acute complaints.   Pain is well controlled. Pt had 101.7 fever overnight- denies any cough/CP/dysuria/SOB.   Denies paresthesias, N/V/D, palpitations.     Vital Signs Last 24 Hrs  T(C): 37.2 (24 Aug 2020 05:05), Max: 38.7 (23 Aug 2020 21:02)  T(F): 99 (24 Aug 2020 05:05), Max: 101.7 (23 Aug 2020 21:02)  HR: 61 (24 Aug 2020 05:05) (48 - 80)  BP: 152/70 (24 Aug 2020 05:05) (128/68 - 152/70)  BP(mean): 74 (23 Aug 2020 13:46) (74 - 78)  RR: 18 (24 Aug 2020 05:05) (12 - 18)  SpO2: 96% (24 Aug 2020 05:05) (95% - 100%)  I&O's Detail    23 Aug 2020 07:01  -  24 Aug 2020 07:00  --------------------------------------------------------  IN:    IV PiggyBack: 50 mL    Lactated Ringers IV Bolus: 1250 mL    sodium chloride 0.9%.: 500 mL  Total IN: 1800 mL    OUT:  Total OUT: 0 mL    Total NET: 1800 mL    Physical Exam:    General: AAOx3, NAD, resting comfortably in bed.    R Hip: Aquacell Dressing is C/D/I. Skin is pink and warm. No erythema. SILT.  Wound with no drainage, healing well.   Lower extremity:  No calf tenderness, calves are soft. 2+pulses. NVI. (+)EHL/FHL/ADF/APF.  Good capillary refill. Warm, well-perfused.                           11.9   9.45  )-----------( 130      ( 24 Aug 2020 06:59 )             34.6     08-24    135  |  102  |  x   ----------------------------<  x   4.1   |  x   |  x     Ca    8.4      23 Aug 2020 13:27  Phos  2.9     08-23  Mg     2.2     08-23    TPro  6.9  /  Alb  3.3<L>  /  TBili  2.5<H>  /  DBili  x   /  AST  56<H>  /  ALT  61<H>  /  AlkPhos  107  08-23      Impression:  61 y/o Male S/p R Hip IM Nailing POD# 1  Plan:  -  Pain control  -  DVT prophylaxis  -  Daily Physical Therapy:  WBAT  to [facility]  -  Discharge planning: Home Vs. Rehab pending Physical therapy eval  -  Continue with Post-op Antibiotics x 24hrs  -  Monitor temps- urged importance of incentive spirometer  -  Case d/w Dr. Marques
Ortho Preop Note    Patient is a 60y old  Male who presents with a chief complaint of Hypoglycemia and Fall (23 Aug 2020 07:55)    Diagnosis: Right Hip Intertrochanteric Fracture, Displaced  Procedure: Right Hip Intramedullary Nailing  Surgeon: Yossi Marques MD                          13.5   14.45 )-----------( 165      ( 23 Aug 2020 06:54 )             41.3     08-23    135  |  100  |  14  ----------------------------<  337<H>  4.6   |  29  |  1.13    Ca    8.9      23 Aug 2020 06:54  Phos  2.9     08-  Mg     2.2     08-23    TPro  6.9  /  Alb  3.3<L>  /  TBili  2.5<H>  /  DBili  x   /  AST  56<H>  /  ALT  61<H>  /  AlkPhos  107  08-23    PT/INR - ( 23 Aug 2020 00:44 )   PT: 12.9 sec;   INR: 1.11 ratio         PTT - ( 23 Aug 2020 00:44 )  PTT:30.6 sec  Urinalysis Basic - ( 23 Aug 2020 01:42 )    Color: Yellow / Appearance: Clear / S.010 / pH: x  Gluc: x / Ketone: Negative  / Bili: Negative / Urobili: Negative   Blood: x / Protein: 15 / Nitrite: Negative   Leuk Esterase: Negative / RBC: 0-2 /HPF / WBC 0-2 /HPF   Sq Epi: x / Non Sq Epi: Few /HPF / Bacteria: Few /HPF        [x ] Type & Screen available, T&C for 2 units on hold for OR  [x ] NPO/IVF  [x ] Consent - patient verbally consents for surgical fixation, to be obtained by Dr. Marques  [x ] Clearance - Medically Stable as per Dr. Oneal  [ x] Added on to OR Schedule for today  [x ] COVID19  swab = Negative

## 2020-08-24 NOTE — PHYSICAL THERAPY INITIAL EVALUATION ADULT - IMPAIRMENTS FOUND, PT EVAL
gait, locomotion, and balance/muscle strength/ROM/ventilation and respiration/gas exchange/joint integrity and mobility

## 2020-08-24 NOTE — CONSULT NOTE ADULT - SUBJECTIVE AND OBJECTIVE BOX
Source of information: , Chart review, patient  Patient language: English  : n/a    CC: Patient is a 60y old  Male who presents with a chief complaint of Hypoglycemia and Fall (24 Aug 2020 08:00)      HPI:  Pt is a 60 year old male from home, lives alone with PMHx of IDDM who presented to ED s/p fall. Pt states that he was out on a walk when he felt hypoglycemic symptoms, he ate some chocolate and then started running again once his symptoms improved but then he felt weak and had a fall. Pt states that he tried to get up but couldn't do so due to pain. 911 was called and patient was brought to hospital. As per EMS, BSL was 60's, patient was given oral glucose and sugar improved to 80's. Pt had multiple ED visits in past due to severe hypoglycemia and syncopal episodes. Pt states that he is physically very active and walks for hours, takes Insulin 70/30 24 units in morning and 15 at night. Pt denies any cough, fever, sob, chest pain, abd pain, nausea, vomiting or diarrhea. In ED, Pt underwent imaging studies and was found to have right hip fracture. (23 Aug 2020 03:25)      Patient stated goal for pain control: to be able to take deep breaths, utilize incentive spirometer, get out of bed to chair and ambulate with tolerable pain control. Pt s/p right Hip IM nailing, pod#1.  No pain at this time. Pain increases with movement.  No nausea or vomiting  No chest pain or sob.  Pt is adamant about going home today.  + fevers overnight.  Pending eval by PT.  + elevated TB - medicine team aware.     PAIN SCORE:  2/10     SCALE USED: (1-10 VNRS)    PAST MEDICAL & SURGICAL HISTORY:  Diabetes  No significant past surgical history      FAMILY HISTORY:        SOCIAL HISTORY:  [x ] Denies Smoking, Alcohol, or Drug Use    Allergies    No Known Allergies    Intolerances        MEDICATIONS:    MEDICATIONS  (STANDING):  dextrose 5%. 1000 milliLiter(s) (50 mL/Hr) IV Continuous <Continuous>  dextrose 50% Injectable 12.5 Gram(s) IV Push once  dextrose 50% Injectable 25 Gram(s) IV Push once  dextrose 50% Injectable 25 Gram(s) IV Push once  enoxaparin Injectable 30 milliGRAM(s) SubCutaneous every 12 hours  insulin glargine Injectable (LANTUS) 15 Unit(s) SubCutaneous at bedtime  insulin lispro (HumaLOG) corrective regimen sliding scale   SubCutaneous three times a day before meals  insulin lispro (HumaLOG) corrective regimen sliding scale   SubCutaneous at bedtime  polyethylene glycol 3350 17 Gram(s) Oral daily  senna 2 Tablet(s) Oral at bedtime  sodium chloride 0.9%. 1000 milliLiter(s) (100 mL/Hr) IV Continuous <Continuous>    MEDICATIONS  (PRN):  acetaminophen   Tablet .. 650 milliGRAM(s) Oral every 6 hours PRN Temp greater or equal to 38C (100.4F), Moderate Pain (4 - 6)  dextrose 40% Gel 15 Gram(s) Oral once PRN Blood Glucose LESS THAN 70 milliGRAM(s)/deciliter  glucagon  Injectable 1 milliGRAM(s) IntraMuscular once PRN Glucose LESS THAN 70 milligrams/deciliter  ketorolac   Injectable 30 milliGRAM(s) IV Push every 6 hours PRN Severe Pain (7 - 10)  magnesium hydroxide Suspension 30 milliLiter(s) Oral daily PRN Constipation  ondansetron Injectable 4 milliGRAM(s) IV Push every 6 hours PRN Nausea and/or Vomiting  oxyCODONE    IR 5 milliGRAM(s) Oral every 4 hours PRN Moderate Pain (4 - 6)      Vital Signs Last 24 Hrs  T(C): 37.2 (24 Aug 2020 05:05), Max: 38.7 (23 Aug 2020 21:02)  T(F): 99 (24 Aug 2020 05:05), Max: 101.7 (23 Aug 2020 21:02)  HR: 61 (24 Aug 2020 05:05) (48 - 80)  BP: 152/70 (24 Aug 2020 05:05) (128/68 - 152/70)  BP(mean): 74 (23 Aug 2020 13:46) (74 - 78)  RR: 18 (24 Aug 2020 05:05) (12 - 18)  SpO2: 96% (24 Aug 2020 05:05) (95% - 100%)    LABS:                          11.9   9.45  )-----------( 130      ( 24 Aug 2020 06:59 )             34.6     08-24    135  |  102  |  18  ----------------------------<  222<H>  4.1   |  28  |  0.90    Ca    8.0<L>      24 Aug 2020 06:59  Phos  2.6     08-24  Mg     2.0     08-24    TPro  6.0  /  Alb  2.6<L>  /  TBili  3.5<H>  /  DBili  x   /  AST  38  /  ALT  40  /  AlkPhos  87  08-24    PT/INR - ( 23 Aug 2020 00:44 )   PT: 12.9 sec;   INR: 1.11 ratio         PTT - ( 23 Aug 2020 00:44 )  PTT:30.6 sec  LIVER FUNCTIONS - ( 24 Aug 2020 06:59 )  Alb: 2.6 g/dL / Pro: 6.0 g/dL / ALK PHOS: 87 U/L / ALT: 40 U/L DA / AST: 38 U/L / GGT: x           Urinalysis Basic - ( 23 Aug 2020 01:42 )    Color: Yellow / Appearance: Clear / S.010 / pH: x  Gluc: x / Ketone: Negative  / Bili: Negative / Urobili: Negative   Blood: x / Protein: 15 / Nitrite: Negative   Leuk Esterase: Negative / RBC: 0-2 /HPF / WBC 0-2 /HPF   Sq Epi: x / Non Sq Epi: Few /HPF / Bacteria: Few /HPF      CAPILLARY BLOOD GLUCOSE      POCT Blood Glucose.: 241 mg/dL (24 Aug 2020 07:18)  POCT Blood Glucose.: 301 mg/dL (23 Aug 2020 23:31)  POCT Blood Glucose.: 313 mg/dL (23 Aug 2020 22:00)  POCT Blood Glucose.: 302 mg/dL (23 Aug 2020 16:16)  POCT Blood Glucose.: 279 mg/dL (23 Aug 2020 12:50)      REVIEW OF SYSTEMS:  CONSTITUTIONAL: No fever or fatigue  RESPIRATORY: No cough, wheezing, chills or hemoptysis; No shortness of breath  CARDIOVASCULAR: No chest pain, palpitations, dizziness, or leg swelling  GASTROINTESTINAL: No abdominal or epigastric pain. No nausea, vomiting; No diarrhea or constipation.   GENITOURINARY: No dysuria, frequency, hematuria, retention or incontinence  MUSCULOSKELETAL:  +right hip pain  NEURO: No weakness, no numbness   PSYCHIATRIC: No depression, anxiety, mood swings, or difficulty sleeping    PHYSICAL EXAM:  GENERAL:  Alert & Oriented X3, NAD, Good concentration  CHEST/LUNG: Clear to auscultation bilaterally; No rales, rhonchi, wheezing, or rubs  HEART: Regular rate and rhythm; No murmurs, rubs, or gallops  ABDOMEN: Soft, Nontender, Nondistended; Bowel sounds present  : no incontinence, no flank pain  EXTREMITIES:  2+ Peripheral Pulses, No cyanosis, or edema  MUSCULOSKELETAL: + right hip tenderness + decreased rom   NEUROLOGICAL: awake and alert and oriented   SKIN: No rashes or lesions    Radiology:  < from: Xray Chest 1 View- PORTABLE-Urgent (20 @ 01:28) >  EXAM:  XR CHEST PORTABLE URGENT 1V                            PROCEDURE DATE:  2020          INTERPRETATION:  CLINICAL INDICATION: 60 years  Male with cough.    COMPARISON: 2014    AP view of the chest demonstrates the lungs to be clear.There is no pleural effusion. There is no pneumothorax.    The heart is mildly enlarged. There is no mediastinal or hilar mass.    The pulmonary vasculature is normal.    Mild thoracic degenerative changes are present.    IMPRESSION:    Mild cardiomegaly. No acute infiltrate.      < end of copied text >      Drug Screen:  enoxaparin Injectable 30 milliGRAM(s) SubCutaneous every 12 hours          ORT Score   Family Hx of substance abuse	Female	Male  Alcohol 	                                              1              3  Illegal drugs	                                      2              3  Rx drugs                                               4	      4    Personal Hx of substance abuse		  Alcohol 	                                               3	      3  Illegal drugs                                  	       4	      4  Rx drugs                                                5	      5  Age between 16- 45 years	               1             1  hx preadolescent sexual abuse	       3	      0  Psychological disease		  ADD, OCD, bipolar, schizophrenia	2	      2  Depression                                    	1	      1  Score totals 		 0  		  a score of 3 or lower indicates low risk for opioid abuse		  a score of 4-7 indicates moderate risk for opioid abuse		  a score of 8 or higher indicates high risk for opioid abuse	    Risk factors associated with adverse outcomes related to opioid treatment  [ ]  Concurrent benzodiazepine use  [ ]  History/ Active substance use or alcohol use disorder  [ ] Psychiatric co-morbidity  [ ] Sleep apnea  [ ] COPD  [ ] BMI> 35  [ ] Liver dysfunction  [ ] Renal dysfunction  [ ] CHF  [ ] Smoker  [ ]  Age > 60 years      [ x]  [place]  Reviewed and Copied to Chart. See below. Source of information: , Chart review, patient  Patient language: English  : n/a    CC: Patient is a 60y old  Male who presents with a chief complaint of Hypoglycemia and Fall (24 Aug 2020 08:00)      HPI:  Pt is a 60 year old male from home, lives alone with PMHx of IDDM who presented to ED s/p fall. Pt states that he was out on a walk when he felt hypoglycemic symptoms, he ate some chocolate and then started running again once his symptoms improved but then he felt weak and had a fall. Pt states that he tried to get up but couldn't do so due to pain. 911 was called and patient was brought to hospital. As per EMS, BSL was 60's, patient was given oral glucose and sugar improved to 80's. Pt had multiple ED visits in past due to severe hypoglycemia and syncopal episodes. Pt states that he is physically very active and walks for hours, takes Insulin 70/30 24 units in morning and 15 at night. Pt denies any cough, fever, sob, chest pain, abd pain, nausea, vomiting or diarrhea. In ED, Pt underwent imaging studies and was found to have right hip fracture. (23 Aug 2020 03:25)      Patient stated goal for pain control: to be able to take deep breaths, utilize incentive spirometer, get out of bed to chair and ambulate with tolerable pain control. Pt s/p right Hip IM nailing, pod#1.  No pain at this time. Pain increases with movement.  No nausea or vomiting  No chest pain or sob.  Pt is adamant about going home today.  + fevers overnight.  Pending eval by PT.  + elevated TB - medicine team aware.     PAIN SCORE:  2/10     SCALE USED: (1-10 VNRS)    PAST MEDICAL & SURGICAL HISTORY:  Diabetes  No significant past surgical history      FAMILY HISTORY:        SOCIAL HISTORY:  [x ] Denies Smoking, Alcohol, or Drug Use    Allergies    No Known Allergies    Intolerances        MEDICATIONS:    MEDICATIONS  (STANDING):  dextrose 5%. 1000 milliLiter(s) (50 mL/Hr) IV Continuous <Continuous>  dextrose 50% Injectable 12.5 Gram(s) IV Push once  dextrose 50% Injectable 25 Gram(s) IV Push once  dextrose 50% Injectable 25 Gram(s) IV Push once  enoxaparin Injectable 30 milliGRAM(s) SubCutaneous every 12 hours  insulin glargine Injectable (LANTUS) 15 Unit(s) SubCutaneous at bedtime  insulin lispro (HumaLOG) corrective regimen sliding scale   SubCutaneous three times a day before meals  insulin lispro (HumaLOG) corrective regimen sliding scale   SubCutaneous at bedtime  polyethylene glycol 3350 17 Gram(s) Oral daily  senna 2 Tablet(s) Oral at bedtime  sodium chloride 0.9%. 1000 milliLiter(s) (100 mL/Hr) IV Continuous <Continuous>    MEDICATIONS  (PRN):  acetaminophen   Tablet .. 650 milliGRAM(s) Oral every 6 hours PRN Temp greater or equal to 38C (100.4F), Moderate Pain (4 - 6)  dextrose 40% Gel 15 Gram(s) Oral once PRN Blood Glucose LESS THAN 70 milliGRAM(s)/deciliter  glucagon  Injectable 1 milliGRAM(s) IntraMuscular once PRN Glucose LESS THAN 70 milligrams/deciliter  ketorolac   Injectable 30 milliGRAM(s) IV Push every 6 hours PRN Severe Pain (7 - 10)  magnesium hydroxide Suspension 30 milliLiter(s) Oral daily PRN Constipation  ondansetron Injectable 4 milliGRAM(s) IV Push every 6 hours PRN Nausea and/or Vomiting  oxyCODONE    IR 5 milliGRAM(s) Oral every 4 hours PRN Moderate Pain (4 - 6)      Vital Signs Last 24 Hrs  T(C): 37.2 (24 Aug 2020 05:05), Max: 38.7 (23 Aug 2020 21:02)  T(F): 99 (24 Aug 2020 05:05), Max: 101.7 (23 Aug 2020 21:02)  HR: 61 (24 Aug 2020 05:05) (48 - 80)  BP: 152/70 (24 Aug 2020 05:05) (128/68 - 152/70)  BP(mean): 74 (23 Aug 2020 13:46) (74 - 78)  RR: 18 (24 Aug 2020 05:05) (12 - 18)  SpO2: 96% (24 Aug 2020 05:05) (95% - 100%)    LABS:                          11.9   9.45  )-----------( 130      ( 24 Aug 2020 06:59 )             34.6     08-24    135  |  102  |  18  ----------------------------<  222<H>  4.1   |  28  |  0.90    Ca    8.0<L>      24 Aug 2020 06:59  Phos  2.6     08-24  Mg     2.0     08-24    TPro  6.0  /  Alb  2.6<L>  /  TBili  3.5<H>  /  DBili  x   /  AST  38  /  ALT  40  /  AlkPhos  87  08-24    PT/INR - ( 23 Aug 2020 00:44 )   PT: 12.9 sec;   INR: 1.11 ratio         PTT - ( 23 Aug 2020 00:44 )  PTT:30.6 sec  LIVER FUNCTIONS - ( 24 Aug 2020 06:59 )  Alb: 2.6 g/dL / Pro: 6.0 g/dL / ALK PHOS: 87 U/L / ALT: 40 U/L DA / AST: 38 U/L / GGT: x           Urinalysis Basic - ( 23 Aug 2020 01:42 )    Color: Yellow / Appearance: Clear / S.010 / pH: x  Gluc: x / Ketone: Negative  / Bili: Negative / Urobili: Negative   Blood: x / Protein: 15 / Nitrite: Negative   Leuk Esterase: Negative / RBC: 0-2 /HPF / WBC 0-2 /HPF   Sq Epi: x / Non Sq Epi: Few /HPF / Bacteria: Few /HPF      CAPILLARY BLOOD GLUCOSE      POCT Blood Glucose.: 241 mg/dL (24 Aug 2020 07:18)  POCT Blood Glucose.: 301 mg/dL (23 Aug 2020 23:31)  POCT Blood Glucose.: 313 mg/dL (23 Aug 2020 22:00)  POCT Blood Glucose.: 302 mg/dL (23 Aug 2020 16:16)  POCT Blood Glucose.: 279 mg/dL (23 Aug 2020 12:50)      REVIEW OF SYSTEMS:  CONSTITUTIONAL: No fever or fatigue  RESPIRATORY: No cough, wheezing, chills or hemoptysis; No shortness of breath  CARDIOVASCULAR: No chest pain, palpitations, dizziness, or leg swelling  GASTROINTESTINAL: No abdominal or epigastric pain. No nausea, vomiting; No diarrhea or constipation.   GENITOURINARY: No dysuria, frequency, hematuria, retention or incontinence  MUSCULOSKELETAL:  +right hip pain  NEURO: No weakness, no numbness   PSYCHIATRIC: No depression, anxiety, mood swings, or difficulty sleeping    PHYSICAL EXAM:  GENERAL:  Alert & Oriented X3, NAD, Good concentration + icterus   CHEST/LUNG: Clear to auscultation bilaterally; No rales, rhonchi, wheezing, or rubs  HEART: Regular rate and rhythm; No murmurs, rubs, or gallops  ABDOMEN: Soft, Nontender, Nondistended; Bowel sounds present  : no incontinence, no flank pain  EXTREMITIES:  2+ Peripheral Pulses, No cyanosis, or edema  MUSCULOSKELETAL: + right hip tenderness + decreased rom   NEUROLOGICAL: awake and alert and oriented   SKIN: No rashes or lesions    Radiology:  < from: Xray Chest 1 View- PORTABLE-Urgent (20 @ 01:28) >  EXAM:  XR CHEST PORTABLE URGENT 1V                            PROCEDURE DATE:  2020          INTERPRETATION:  CLINICAL INDICATION: 60 years  Male with cough.    COMPARISON: 2014    AP view of the chest demonstrates the lungs to be clear.There is no pleural effusion. There is no pneumothorax.    The heart is mildly enlarged. There is no mediastinal or hilar mass.    The pulmonary vasculature is normal.    Mild thoracic degenerative changes are present.    IMPRESSION:    Mild cardiomegaly. No acute infiltrate.      < end of copied text >      Drug Screen:  enoxaparin Injectable 30 milliGRAM(s) SubCutaneous every 12 hours          ORT Score   Family Hx of substance abuse	Female	Male  Alcohol 	                                              1              3  Illegal drugs	                                      2              3  Rx drugs                                               4	      4    Personal Hx of substance abuse		  Alcohol 	                                               3	      3  Illegal drugs                                  	       4	      4  Rx drugs                                                5	      5  Age between 16- 45 years	               1             1  hx preadolescent sexual abuse	       3	      0  Psychological disease		  ADD, OCD, bipolar, schizophrenia	2	      2  Depression                                    	1	      1  Score totals 		 0  		  a score of 3 or lower indicates low risk for opioid abuse		  a score of 4-7 indicates moderate risk for opioid abuse		  a score of 8 or higher indicates high risk for opioid abuse	    Risk factors associated with adverse outcomes related to opioid treatment  [ ]  Concurrent benzodiazepine use  [ ]  History/ Active substance use or alcohol use disorder  [ ] Psychiatric co-morbidity  [ ] Sleep apnea  [ ] COPD  [ ] BMI> 35  [ ] Liver dysfunction  [ ] Renal dysfunction  [ ] CHF  [ ] Smoker  [ ]  Age > 60 years      [ x]  [place]  Reviewed and Copied to Chart. See below.

## 2020-08-24 NOTE — DISCHARGE NOTE PROVIDER - NSDCCPCAREPLAN_GEN_ALL_CORE_FT
PRINCIPAL DISCHARGE DIAGNOSIS  Diagnosis: Hip fracture  Assessment and Plan of Treatment: Undergone intramedullary nailing on 8/23. Tolerated procedure well. Please follow up the instructions from your orthopedic surgeon. Please continue to take Eliquis for DVT prophylaxis for minimum 14 days. If you are not ambulatory by 14 days, it should be extended to 30 days.      SECONDARY DISCHARGE DIAGNOSES  Diagnosis: Hyperbilirubinemia  Assessment and Plan of Treatment: Your bilirubin level was high. You did not report any known liver or gall bladder disease. No change in yor weight or appetite. Please follow up with your PCP to recheck bilirubin level, and have further work up. We requested for US RUQ and hepatitis profile but you decided to do work up as outpatient.    Diagnosis: Diabetes mellitus  Assessment and Plan of Treatment: Please resume your home dose of insulin. We are concerned about your episodes of hypoglycemia. You reported episodes happen when you exercise. You were counselled about snacking before exercise. You are well educated about symptoms of hypoglycemia. Please follow up with your primary endocrinologist if any dose adjustment is needed. We are keeping the same insulin regimen since you wont be exercising post surgery and your blood sugar was high while in hospital. Please monitor your blood sugar closely. If below <60 you should hold your insulin, and have sugar containing food. If blood sugar runs above 400, please come to emergency. PRINCIPAL DISCHARGE DIAGNOSIS  Diagnosis: Hip fracture  Assessment and Plan of Treatment: Undergone intramedullary nailing on 8/23. Tolerated procedure well. Please follow up the instructions from your orthopedic surgeon. Please continue to take Eliquis for DVT prophylaxis for minimum 14 days. If you are not ambulatory by 14 days, it should be extended to 30 days.  Pain Management- See Attached Medication Reconciliation  Weight Bearing Status:  wbat to RLE with walker   Equipment needs: Commode, Walker  Dressing: Please keep bandage/dressing Clean, Dry, and Intact.  Incision Site: keep Clean and dry  Dvt prophylaxis: Continue with Eliquis   PT/Occupational Therapy are Activities of Daily Living as appropriate  Follow up with Orthopedic Surgeon, Dr. Marques in office in 1-2 weeks at: [phone]      SECONDARY DISCHARGE DIAGNOSES  Diagnosis: Hyperbilirubinemia  Assessment and Plan of Treatment: Your bilirubin level was high. You did not report any known liver or gall bladder disease. No change in yor weight or appetite. Please follow up with your PCP to recheck bilirubin level, and have further work up. We requested for US RUQ (pending result) and hepatitis profile. You decided to do further work up as outpatient.    Diagnosis: Diabetes mellitus  Assessment and Plan of Treatment: Please resume your home dose of insulin. We are concerned about your episodes of hypoglycemia. You reported episodes happen when you exercise. You were counselled about snacking before exercise. You are well educated about symptoms of hypoglycemia. Please follow up with your primary endocrinologist if any dose adjustment is needed. We are keeping the same insulin regimen since you wont be exercising post surgery and your blood sugar was high while in hospital. Please monitor your blood sugar closely. If below <60 you should hold your insulin, and have sugar containing food. If blood sugar runs above 400, please come to emergency.

## 2021-02-10 NOTE — PHYSICAL THERAPY INITIAL EVALUATION ADULT - BED MOBILITY LIMITATIONS, REHAB EVAL
Left message for patient that she needs urine drug screen on voice mail identifying her by first and last name. decreased ability to use legs for bridging/pushing

## 2021-02-20 ENCOUNTER — EMERGENCY (EMERGENCY)
Facility: HOSPITAL | Age: 61
LOS: 1 days | Discharge: LEFT BEFORE DISCHARGE | End: 2021-02-20
Payer: SELF-PAY

## 2021-02-20 VITALS
HEIGHT: 74 IN | WEIGHT: 218.92 LBS | RESPIRATION RATE: 16 BRPM | HEART RATE: 91 BPM | TEMPERATURE: 98 F | OXYGEN SATURATION: 96 % | DIASTOLIC BLOOD PRESSURE: 103 MMHG | SYSTOLIC BLOOD PRESSURE: 177 MMHG

## 2021-02-20 PROCEDURE — L9991: CPT

## 2021-02-20 NOTE — ED ADULT NURSE NOTE - EXPLANATION OF PATIENT'S REASON FOR LEAVING
patient is A+Ox3 in no acute resp distress, denies pain, patient ambulated coming in with [employer]. patient walked out in steady gait. patient is not agitated. RN arlene dinero made aware

## 2021-03-11 NOTE — ED ADULT TRIAGE NOTE - BSA (M2)
Goal Outcome Evaluation:           Pt is 56 y/o M admitted for CP. Pt came up on nitro running @ 40 mcg/min. Pt stated his normal B/P runs in the 90s sbp. On admit pt's b/p was 109 sbp- he stated this was high for him. Pt was c/o pain in the left chest radiating to the neck down the left arm. Call was placed to the hospitalist OLESYA Michelle, she ordered a one time dose of Hydromorphone 0.5 mg IV. Pt stated this intervention has been effective w/ tx of CP. Pt's sbp dropped below 90- nitro was decreased from 40 mcg/min to 30 mcg/min. Pt's bp continues to be low w/ last reading 87/45. Pt states this is normal for him. Pt on 2 L O2. D-dimer elevated -pt was negative for P.E. Recent covid positive from 2/6/21, pt was swabbed in the ED before admit and screened negative for covid this stay. Cardiology has been consulted- pt's cardiologist is Dr. Cervantes- will contact in AM. VSS, [facility]ARA SBP & CP.          Problem: Adult Inpatient Plan of Care  Goal: Plan of Care Review  Outcome: Ongoing, Progressing  Goal: Patient-Specific Goal (Individualized)  Outcome: Ongoing, Progressing  Goal: Absence of Hospital-Acquired Illness or Injury  Outcome: Ongoing, Progressing  Intervention: Identify and Manage Fall Risk  Recent Flowsheet Documentation  Taken 3/11/2021 0158 by John Schwarz, RN  Safety Promotion/Fall Prevention:  • safety round/check completed  • room organization consistent  • nonskid shoes/slippers when out of bed  • fall prevention program maintained  • clutter free environment maintained  • assistive device/personal items within reach  Taken 3/11/2021 0015 by John Schwarz, RN  Safety Promotion/Fall Prevention:  • safety round/check completed  • room organization consistent  • nonskid shoes/slippers when out of bed  • lighting adjusted  • fall prevention program maintained  • clutter free environment maintained  • assistive device/personal items within reach  • activity supervised  Taken 3/10/2021 2200 by Chong  LEYDI Lopez  Safety Promotion/Fall Prevention:  • safety round/check completed  • room organization consistent  • nonskid shoes/slippers when out of bed  • fall prevention program maintained  • clutter free environment maintained  • assistive device/personal items within reach  Taken 3/10/2021 2040 by John Schwarz RN  Safety Promotion/Fall Prevention:  • safety round/check completed  • room organization consistent  • nonskid shoes/slippers when out of bed  • lighting adjusted  • fall prevention program maintained  • clutter free environment maintained  • assistive device/personal items within reach  • activity supervised  Intervention: Prevent and Manage VTE (venous thromboembolism) Risk  Recent Flowsheet Documentation  Taken 3/11/2021 0015 by John Schwarz RN  VTE Prevention/Management: (Lovenox) other (see comments)  Taken 3/10/2021 2040 by John Schwarz RN  VTE Prevention/Management: (Lovenox) other (see comments)  Intervention: Prevent Infection  Recent Flowsheet Documentation  Taken 3/11/2021 0158 by John Schwarz RN  Infection Prevention:  • hand hygiene promoted  • personal protective equipment utilized  • rest/sleep promoted  Taken 3/11/2021 0015 by John Schwarz RN  Infection Prevention:  • hand hygiene promoted  • personal protective equipment utilized  • rest/sleep promoted  Taken 3/10/2021 2200 by John Schwarz RN  Infection Prevention:  • hand hygiene promoted  • personal protective equipment utilized  • rest/sleep promoted  Taken 3/10/2021 2040 by John Schwarz RN  Infection Prevention:  • hand hygiene promoted  • personal protective equipment utilized  • rest/sleep promoted  Goal: Optimal Comfort and Wellbeing  Outcome: Ongoing, Progressing  Intervention: Provide Person-Centered Care  Recent Flowsheet Documentation  Taken 3/11/2021 0015 by John Schwarz RN  Trust Relationship/Rapport:  • care explained  • emotional support provided  • questions answered  • reassurance  provided  • thoughts/feelings acknowledged  Taken 3/10/2021 2040 by John Schwarz, RN  Trust Relationship/Rapport:  • care explained  • emotional support provided  • questions answered  • reassurance provided  • thoughts/feelings acknowledged  Goal: Readiness for Transition of Care  Outcome: Ongoing, Progressing  Intervention: Mutually Develop Transition Plan  Recent Flowsheet Documentation  Taken 3/11/2021 0148 by John Schwarz RN  Transportation Anticipated: family or friend will provide  Patient/Family Anticipated Services at Transition: none  Patient/Family Anticipates Transition to: home with family  Taken 3/11/2021 0147 by John Schwarz RN  Equipment Currently Used at Home:  • walker, rolling  • cane, straight  • nebulizer     Problem: Adjustment to Illness (Heart Failure)  Goal: Optimal Coping  Outcome: Ongoing, Progressing     Problem: Arrhythmia/Dysrhythmia (Heart Failure)  Goal: Stable Heart Rate and Rhythm  Outcome: Ongoing, Progressing     Problem: Cardiac Output Decreased (Heart Failure)  Goal: Optimal Cardiac Output  Outcome: Ongoing, Progressing     Problem: Fluid Imbalance (Heart Failure)  Goal: Fluid Balance  Outcome: Ongoing, Progressing     Problem: Functional Ability Impaired (Heart Failure)  Goal: Optimal Functional Ability  Outcome: Ongoing, Progressing     Problem: Oral Intake Inadequate (Heart Failure)  Goal: Optimal Nutrition Intake  Outcome: Ongoing, Progressing     Problem: Respiratory Compromise (Heart Failure)  Goal: Effective Oxygenation and Ventilation  Outcome: Ongoing, Progressing  Intervention: Promote Airway Secretion Clearance  Recent Flowsheet Documentation  Taken 3/11/2021 0015 by John Schwarz RN  Cough And Deep Breathing: done independently per patient  Taken 3/10/2021 2040 by John Schwarz RN  Cough And Deep Breathing: done independently per patient     Problem: Sleep Disordered Breathing (Heart Failure)  Goal: Effective Breathing Pattern During  Sleep  Outcome: Ongoing, Progressing     Problem: Chest Pain  Goal: Resolution of Chest Pain Symptoms  Outcome: Ongoing, Progressing      2.26

## 2021-05-27 ENCOUNTER — EMERGENCY (EMERGENCY)
Facility: HOSPITAL | Age: 61
LOS: 1 days | Discharge: LEFT BEFORE DISCHARGE | End: 2021-05-27
Attending: EMERGENCY MEDICINE
Payer: SELF-PAY

## 2021-05-27 VITALS — DIASTOLIC BLOOD PRESSURE: 84 MMHG | HEART RATE: 96 BPM | HEIGHT: 74 IN | SYSTOLIC BLOOD PRESSURE: 156 MMHG

## 2021-05-27 LAB
ANION GAP SERPL CALC-SCNC: 2 MMOL/L — LOW (ref 5–17)
BASOPHILS # BLD AUTO: 0.05 K/UL — SIGNIFICANT CHANGE UP (ref 0–0.2)
BASOPHILS NFR BLD AUTO: 0.6 % — SIGNIFICANT CHANGE UP (ref 0–2)
BUN SERPL-MCNC: 14 MG/DL — SIGNIFICANT CHANGE UP (ref 7–18)
CALCIUM SERPL-MCNC: 9 MG/DL — SIGNIFICANT CHANGE UP (ref 8.4–10.5)
CHLORIDE SERPL-SCNC: 103 MMOL/L — SIGNIFICANT CHANGE UP (ref 96–108)
CO2 SERPL-SCNC: 33 MMOL/L — HIGH (ref 22–31)
CREAT SERPL-MCNC: 0.82 MG/DL — SIGNIFICANT CHANGE UP (ref 0.5–1.3)
EOSINOPHIL # BLD AUTO: 0.11 K/UL — SIGNIFICANT CHANGE UP (ref 0–0.5)
EOSINOPHIL NFR BLD AUTO: 1.2 % — SIGNIFICANT CHANGE UP (ref 0–6)
GLUCOSE SERPL-MCNC: 91 MG/DL — SIGNIFICANT CHANGE UP (ref 70–99)
HCT VFR BLD CALC: 43.8 % — SIGNIFICANT CHANGE UP (ref 39–50)
HGB BLD-MCNC: 14.2 G/DL — SIGNIFICANT CHANGE UP (ref 13–17)
IMM GRANULOCYTES NFR BLD AUTO: 0.6 % — SIGNIFICANT CHANGE UP (ref 0–1.5)
LYMPHOCYTES # BLD AUTO: 0.83 K/UL — LOW (ref 1–3.3)
LYMPHOCYTES # BLD AUTO: 9.3 % — LOW (ref 13–44)
MCHC RBC-ENTMCNC: 30.1 PG — SIGNIFICANT CHANGE UP (ref 27–34)
MCHC RBC-ENTMCNC: 32.4 GM/DL — SIGNIFICANT CHANGE UP (ref 32–36)
MCV RBC AUTO: 92.8 FL — SIGNIFICANT CHANGE UP (ref 80–100)
MONOCYTES # BLD AUTO: 0.62 K/UL — SIGNIFICANT CHANGE UP (ref 0–0.9)
MONOCYTES NFR BLD AUTO: 6.9 % — SIGNIFICANT CHANGE UP (ref 2–14)
NEUTROPHILS # BLD AUTO: 7.27 K/UL — SIGNIFICANT CHANGE UP (ref 1.8–7.4)
NEUTROPHILS NFR BLD AUTO: 81.4 % — HIGH (ref 43–77)
NRBC # BLD: 0 /100 WBCS — SIGNIFICANT CHANGE UP (ref 0–0)
PLATELET # BLD AUTO: 218 K/UL — SIGNIFICANT CHANGE UP (ref 150–400)
POTASSIUM SERPL-MCNC: 4.2 MMOL/L — SIGNIFICANT CHANGE UP (ref 3.5–5.3)
POTASSIUM SERPL-SCNC: 4.2 MMOL/L — SIGNIFICANT CHANGE UP (ref 3.5–5.3)
RBC # BLD: 4.72 M/UL — SIGNIFICANT CHANGE UP (ref 4.2–5.8)
RBC # FLD: 12.8 % — SIGNIFICANT CHANGE UP (ref 10.3–14.5)
SODIUM SERPL-SCNC: 138 MMOL/L — SIGNIFICANT CHANGE UP (ref 135–145)
WBC # BLD: 8.93 K/UL — SIGNIFICANT CHANGE UP (ref 3.8–10.5)
WBC # FLD AUTO: 8.93 K/UL — SIGNIFICANT CHANGE UP (ref 3.8–10.5)

## 2021-05-27 PROCEDURE — 93005 ELECTROCARDIOGRAM TRACING: CPT

## 2021-05-27 PROCEDURE — 99284 EMERGENCY DEPT VISIT MOD MDM: CPT

## 2021-05-27 PROCEDURE — 82962 GLUCOSE BLOOD TEST: CPT

## 2021-05-27 PROCEDURE — 80048 BASIC METABOLIC PNL TOTAL CA: CPT

## 2021-05-27 PROCEDURE — 36415 COLL VENOUS BLD VENIPUNCTURE: CPT

## 2021-05-27 PROCEDURE — 93010 ELECTROCARDIOGRAM REPORT: CPT

## 2021-05-27 PROCEDURE — 85025 COMPLETE CBC W/AUTO DIFF WBC: CPT

## 2021-05-27 PROCEDURE — 99284 EMERGENCY DEPT VISIT MOD MDM: CPT | Mod: 25

## 2021-05-27 PROCEDURE — 96374 THER/PROPH/DIAG INJ IV PUSH: CPT

## 2021-05-27 RX ORDER — DEXTROSE 50 % IN WATER 50 %
50 SYRINGE (ML) INTRAVENOUS ONCE
Refills: 0 | Status: COMPLETED | OUTPATIENT
Start: 2021-05-27 | End: 2021-05-27

## 2021-05-27 RX ADMIN — Medication 50 MILLILITER(S): at 14:10

## 2021-05-27 NOTE — ED PROVIDER NOTE - SKIN WOUND DESCRIPTION
abrasions to both anterior knees, abrasions to MCP joint of R dorsum of hand, contusion and abrasion to occipital scalp

## 2021-05-27 NOTE — ED PROVIDER NOTE - OBJECTIVE STATEMENT
62 y/o M with a significant PMHx of DM, took NPH at 8:30 this morning, did not eat breakfast or lunch, was found on the street unresponsive. Ambulance was called, patient was given D25 and then woke up. Patient refused to go to the hospital at the time. Patient was again found on the ground a 2nd time after hitting his head and being unconscious. Glucose level was noted to be low and patient was then brought to the ER. Patient woke up after being given dextrose here and has no complaints. Patient is requesting to leave. Denies SI, HI, drinking alcohol, abusing drugs or any other acute complaints.

## 2021-05-27 NOTE — ED PROVIDER NOTE - CLINICAL SUMMARY MEDICAL DECISION MAKING FREE TEXT BOX
After dextrose, tray of pasta and multiple apple sauces, patient is A&O x3. Patient denied any complaints, demanding to leave. Patient refused all further workup. Patient walked out before signing AMA form.

## 2021-05-27 NOTE — ED PROVIDER NOTE - PROGRESS NOTE DETAILS
Patient is aaox3 and is asking to leave. The patient does not want to further testing and understands the risk of leaving including permanent disability and DEATH. Risk, benefit, hazards, alternatives and precautions were reviewed and the pt voices understanding. He is aware that she may return at anytime and that close follow up with primary care doctor is recommended as soon as possible. He voices understanding. Will sign AMA He had a tray of food and apple sauce.

## 2021-05-27 NOTE — ED ADULT NURSE NOTE - OBJECTIVE STATEMENT
Pt BIB EMS as a rapid response notification from [facility], s/p found on the floor, and p/w hypoglycemia. Pt received unconscious, on stretcher, with neck collar in place. FS "below 20" as per EMS. D50 given by EMS, and D50 given by MD Retana on arrival. Pt regained consciousness after D50 given in ER. Neck collar cleared by MD Simon. Blood work and EKG done. Food provided. Pt aox4, expressed he wanted to leave AMA. MD Simon and Lainey aware. Pt walking Steady gait. No signs of distress noted. Denies pain, dizziness. IV removed, Pt left without signing AMA documents.

## 2021-05-27 NOTE — ED PROVIDER NOTE - MUSCULOSKELETAL, MLM
Spine appears normal, range of motion is not limited, no muscle or joint tenderness. No midline spinal tenderness.

## 2021-05-27 NOTE — ED PROVIDER NOTE - PATIENT PORTAL LINK FT
You can access the FollowMyHealth Patient Portal offered by [facility] by registering at the following website: [email or web address]. By joining [facility]’s FollowMyHealth portal, you will also be able to view your health information using other applications (apps) compatible with our system.

## 2021-12-12 ENCOUNTER — EMERGENCY (EMERGENCY)
Facility: HOSPITAL | Age: 61
LOS: 1 days | Discharge: LEFT BEFORE DISCHARGE | End: 2021-12-12
Payer: SELF-PAY

## 2021-12-12 VITALS
RESPIRATION RATE: 16 BRPM | HEART RATE: 63 BPM | SYSTOLIC BLOOD PRESSURE: 154 MMHG | WEIGHT: 184.97 LBS | OXYGEN SATURATION: 100 % | HEIGHT: 74 IN | DIASTOLIC BLOOD PRESSURE: 73 MMHG | TEMPERATURE: 98 F

## 2021-12-12 PROCEDURE — L9991: CPT

## 2021-12-12 NOTE — ED ADULT TRIAGE NOTE - CHIEF COMPLAINT QUOTE
biba from home with c/o ams bgl on scene was 51 , ems gave oral glucose , patient now is fully awake

## 2022-02-17 NOTE — ED ADULT NURSE NOTE - NSSEPSISSUSPECTED_ED_A_ED
Detail Level: Detailed Add 91396 Cpt? (Important Note: In 2017 The Use Of 85080 Is Being Tracked By Cms To Determine Future Global Period Reimbursement For Global Periods): yes No

## 2022-03-27 ENCOUNTER — EMERGENCY (EMERGENCY)
Facility: HOSPITAL | Age: 62
LOS: 1 days | Discharge: LEFT BEFORE DISCHARGE | End: 2022-03-27
Payer: SELF-PAY

## 2022-03-27 VITALS
DIASTOLIC BLOOD PRESSURE: 82 MMHG | WEIGHT: 187.39 LBS | RESPIRATION RATE: 16 BRPM | TEMPERATURE: 98 F | HEART RATE: 68 BPM | HEIGHT: 74 IN | SYSTOLIC BLOOD PRESSURE: 177 MMHG

## 2022-03-27 PROCEDURE — L9991: CPT

## 2022-03-27 NOTE — ED ADULT TRIAGE NOTE - ARRIVAL INFO ADDITIONAL COMMENTS
Pt ambulatory with steady gait, refused to have fingerstick done just hungry given Sandwhich and consumed

## 2022-08-29 ENCOUNTER — EMERGENCY (EMERGENCY)
Facility: HOSPITAL | Age: 62
LOS: 1 days | Discharge: HOME | End: 2022-08-29
Attending: EMERGENCY MEDICINE
Payer: SELF-PAY

## 2022-08-29 VITALS
RESPIRATION RATE: 16 BRPM | WEIGHT: 180.78 LBS | OXYGEN SATURATION: 100 % | HEART RATE: 60 BPM | DIASTOLIC BLOOD PRESSURE: 64 MMHG | SYSTOLIC BLOOD PRESSURE: 137 MMHG | HEIGHT: 74 IN | TEMPERATURE: 98 F

## 2022-08-29 PROCEDURE — 82962 GLUCOSE BLOOD TEST: CPT

## 2022-08-29 PROCEDURE — 99282 EMERGENCY DEPT VISIT SF MDM: CPT

## 2022-08-29 PROCEDURE — 99283 EMERGENCY DEPT VISIT LOW MDM: CPT

## 2022-08-29 NOTE — ED PROVIDER NOTE - CLINICAL SUMMARY MEDICAL DECISION MAKING FREE TEXT BOX
Patient's blood glucose normalized after hypoglycemic episode. Agreed to eat some food and drank some juice in ED prior to discharge. Advised care precautions and make sure he eats frequently enough and follow up with PMD.

## 2022-08-29 NOTE — ED PROVIDER NOTE - NSFOLLOWUPINSTRUCTIONS_ED_ALL_ED_FT
HYPOGLYCEMIA IN A PERSON WITH DIABETES - AfterCare(R) Instructions(ER/ED)           Hypoglycemia in a Person with Diabetes    WHAT YOU NEED TO KNOW:    Hypoglycemia is a serious condition that happens when your blood glucose (sugar) level drops too low. The blood sugar level is usually too high in a person with diabetes, but the level can also drop too low. It is important to follow your diabetes management plan to keep your blood sugar level steady.    DISCHARGE INSTRUCTIONS:    Have someone call your local emergency number (911 in the [place]) if:   •You have a seizure or pass out.      •Your blood sugar is less than 50 mg/dL and does not respond to treatment.      •You feel you are going to pass out.      •You have trouble thinking clearly.      Call your doctor or diabetes care team provider if:   •You have had symptoms of low blood sugar several times.      •You have questions about the amount of insulin or diabetes medicine you are taking.      •You have questions or concerns about your condition or care.      Medicines:   •Insulin or diabetes medicine help to keep your blood sugar under control.      •Glucagon may be needed if you have severe hypoglycemia.      •Take your medicine as directed. Contact your healthcare provider if you think your medicine is not helping or if you have side effects. Tell your provider if you are allergic to any medicine. Keep a list of the medicines, vitamins, and herbs you take. Include the amounts, and when and why you take them. Bring the list or the pill bottles to follow-up visits. Carry your medicine list with you in case of an emergency.      Manage hypoglycemia:   •Check your blood sugar level right away if you have symptoms of hypoglycemia. Hypoglycemia usually happens when your blood sugar level is 70 mg/dL or below. Ask your diabetes care team provider what blood sugar level is too low for you.      •If your blood sugar level is too low, eat or drink 15 grams of fast-acting carbohydrate. Examples of this amount of fast-acting carbohydrate are 4 ounces (½ cup) of fruit juice or 4 ounces of regular soda. Other examples are 2 tablespoons of raisins or 1 tube of glucose gel.  Ways to Raise Your Blood Sugar     Check your blood sugar level 15 minutes later. Sit still as you wait. If the level is still low (less than 100 mg/dL), have another 15 grams of carbohydrate. When the level returns to 100 mg/dL, eat a meal if it is time. If your meal time is more than 1 hour away, eat a snack. The snack should contain carbohydrates, such as the following:?3/4 cup of cereal      ?1 cup of skim or low fat milk      ?6 soda crackers      ?1/2 of a turkey sandwich      ?15 fat-free chips  This will help prevent another drop in blood sugar. Always carefully follow your provider's instructions on how to treat low blood sugar levels.    •Always carry a source of fast-acting carbohydrate. If you have symptoms of hypoglycemia and you do not have a blood glucose meter, have a source of fast-acting carbohydrate anyway. Avoid carbohydrate foods that are high in fat. The fat content may make the carbohydrate take longer to increase your blood sugar level. Ask your provider if you should carry a glucagon kit. Glucagon is a medicine that is injected when you develop severe hypoglycemia and become unconscious. Check the expiration date every month and replace it before it expires.      •Teach others how to help you if you have symptoms of hypoglycemia. Tell them about the symptoms of hypoglycemia. Ask them to give you a source of fast-acting carbohydrate if you cannot get it yourself. Ask them to give you a glucagon injection if you have signs of hypoglycemia and you become unconscious or have a seizure. Ask them to call the local emergency number (911 in the ). This is an emergency. Tell them never to try to make you swallow anything if you faint or have a seizure.      •Wear medical alert jewelry or carry a card that says you have diabetes. Ask where to get these items.  Medical Alert Jewelry           Prevent hypoglycemia:   •Take diabetes medicine as directed. Take your medicine at the right time and in the right amount. Do not double the amount of medicine you take unless instructed by your diabetes care team provider. You may need oral diabetes medicine, insulin, or both to help control your blood sugar levels. Your healthcare provider will teach you how and when to take oral diabetes medicine. You will also be taught about side effects oral diabetes medicine can cause. Insulin may be added if oral diabetes medicine becomes less effective over time. Insulin may be injected, or given through a pump or pen. You and your care team will discuss which method is best for you.?An insulin pump is an implanted device that gives your insulin 24 hours a day. An insulin pump prevents the need for multiple insulin injections in a day.             ?An insulin pen is a device prefilled with the right amount of insulin.  Insulin Pen            •Eat meals and snacks as directed. Talk to your dietitian or provider about a meal plan that is right for you. Do not skip meals.  Plate Method           •Check your blood sugar level as directed. Ask your provider what your blood sugar levels should be before and after you eat. Ask when and how often to check your blood sugar level. You may need to check a drop of blood in a glucose test machine. You may need to check at least 3 times each day. Record your blood sugar level results and take the record with you when you see your care team. They may use it to make changes to your medicine, food, or exercise schedules. Your care team provider may recommend a continuous glucose monitor (CGM). A CGM is a device that is worn at all times. The CGM checks your blood sugar every 5 minutes. It sends results to an electronic device such as a smart phone.  How to check your blood sugar       Continuous Glucose Monitoring            •Check your blood sugar level before you exercise. Physical activity, such as exercise, can decrease your blood sugar level. If your blood sugar level is less than 100 mg/dL, have a carbohydrate snack. Examples are 4 to 6 crackers, ½ banana, 8 ounces (1 cup) of nonfat or 1% milk, or 4 ounces (½ cup) of juice. If you will be active for more than 1 hour, you may need to check your blood sugar level every 30 minutes. Your provider may also recommend that you check your blood sugar level after your activity.      •Know the risks if you choose to drink alcohol. Alcohol can cause your blood sugar levels to be low if you use insulin. Alcohol can cause high blood sugar levels and weight gain if you drink too much. Women 21 years or older and men 65 years or older should limit alcohol to 1 drink a day. Men aged 21 to 64 years should limit alcohol to 2 drinks a day. A drink of alcohol is 12 ounces of beer, 5 ounces of wine, or 1½ ounces of liquor.      Follow up with your doctor or diabetes care team provider as directed: You may need your insulin or diabetes medicine changed if you continue to have hypoglycemia episodes. Write down your questions so you remember to ask them during your visits.       © Copyright [employer] 2022           back to top                          © Copyright [employer] 2022

## 2022-08-29 NOTE — ED ADULT NURSE NOTE - ILLNESS RECENT EXPOSURE
----- Message from Chandan Dela Cruz MD sent at 1/2/2019  8:56 AM CST -----  Please let pt know gccl negative and pap normal thanks  
None known

## 2022-08-29 NOTE — ED ADULT NURSE NOTE - OBJECTIVE STATEMENT
pt c/o hypoglycimia, didn't eat enough today before jugging, was brought by EMS who also administered D50 at the scene, pt wanted to leave right away at the time when got to ED

## 2022-08-29 NOTE — ED PROVIDER NOTE - PHYSICAL EXAMINATION
All normal  Constitutional: Walking with steady gait. Standing up comfortably during patient encounter.  Neuro: normal

## 2022-08-29 NOTE — ED PROVIDER NOTE - PATIENT PORTAL LINK FT
You can access the FollowMyHealth Patient Portal offered by [facility] by registering at the following website: [email or web address]. By joining [facility]’s FollowMyHealth portal, you will also be able to view your health information using other applications (apps) compatible with our system.

## 2022-08-29 NOTE — ED PROVIDER NOTE - OBJECTIVE STATEMENT
62 year old with pmhx of diabetes on insulin, says he was jogging and begin to feel "out of it". Bystanders helped him call 911, blood glucose was 39. Ems reports giving him iv dextrose, upon arrival to ED, then blood glucose improved to 81. Patient was standing up and talking with Dr. Angeles clearly. Denies any symptoms. Patient reports he knows he didn't eat enough before jog and wants to go home immediately. Does not want any other testing or treatment. Denies fall, syncope, or injuries. Does not take any pills for diabetes. no alc, drug use.   NKDA.

## 2023-04-10 NOTE — ED PROVIDER NOTE - SKIN, MLM
Quality 130: Documentation Of Current Medications In The Medical Record: Current Medications Documented Quality 110: Preventive Care And Screening: Influenza Immunization: Influenza Immunization previously received during influenza season Detail Level: Detailed Quality 111:Pneumonia Vaccination Status For Older Adults: Pneumococcal vaccine (PPSV23) administered on or after patient’s 60th birthday and before the end of the measurement period Skin normal color for race, warm, diaphoretic,

## 2023-07-24 NOTE — ED PROVIDER NOTE - CPE EDP EYES NORM
Your electrolytes are within normal limits.  You do have a slight dehydration which was treated with IV fluids.  Your xray was negative.  Increase fluid intake.     normal...

## 2023-08-31 NOTE — ED ADULT NURSE NOTE - NS TRANSFER PATIENT BELONGINGS
Well appearing, awake, alert, oriented to person, place, time/situation and in no apparent distress. not applicable Clothing normal...

## 2023-10-17 NOTE — H&P ADULT - NSHPSOCIALHISTORY_GEN_ALL_CORE
Subjective   Fareed Dougherty is a 11 y.o. who presents for Sore Throat (Sore throat, cough and chest congestion/No runny nose or fever;  acting normal)  They are accompanied by mother.     HPI  Concern for a few days of sore throat and then throat clearing. Voice is a bit different. Nasal congestion is denied as is fever.   No other ssx, concerns.     Patient Active Problem List   Diagnosis    Astigmatism    Speech articulation disorder     Objective   /61   Pulse 85   Temp 36.2 °C (97.2 °F) (Oral)   Wt 64.9 kg     General - alert and oriented as appropriate for patient and no acute distress  Eyes - normal sclera, no apparent strabismus, no exudate  ENT - moist mucous membranes, oral mucosa pink and without lesions, turbinates are edematous and with some dried blood in the proximal right vestibule , no nasal discharge but some postnasal drip, the right TM is translucent and flat, the left TM is translucent and flat  Cardiac - regular rhythm and no murmurs  Pulmonary - clear to auscultation bilaterally and no increased work of breathing  GI - deferred  Skin - no rashes noted to exposed skin  Neuro - deferred  Lymph - no significant cervical lymphadenopathy   Orthopedic - deferred    Assessment/Plan   Patient Instructions   Diagnoses and all orders for this visit:  Viral URI    Plenty of fluids.  Rest.  Motrin every 6 hours as needed for any discomforts.  Follow up if symptoms are not beginning to improve after 5-7 days.  Follow up with any new concerns or questions.    
Pt denies Etoh, smoking or any drug use history

## 2023-11-19 ENCOUNTER — EMERGENCY (EMERGENCY)
Facility: HOSPITAL | Age: 63
LOS: 1 days | Discharge: HOME | End: 2023-11-19
Attending: EMERGENCY MEDICINE
Payer: SELF-PAY

## 2023-11-19 VITALS
SYSTOLIC BLOOD PRESSURE: 177 MMHG | OXYGEN SATURATION: 97 % | WEIGHT: 184.97 LBS | HEIGHT: 74 IN | HEART RATE: 59 BPM | DIASTOLIC BLOOD PRESSURE: 80 MMHG | TEMPERATURE: 98 F | RESPIRATION RATE: 19 BRPM

## 2023-11-19 PROCEDURE — 70450 CT HEAD/BRAIN W/O DYE: CPT | Mod: MA

## 2023-11-19 PROCEDURE — 12001 RPR S/N/AX/GEN/TRNK 2.5CM/<: CPT

## 2023-11-19 PROCEDURE — 70450 CT HEAD/BRAIN W/O DYE: CPT | Mod: 26,MA

## 2023-11-19 PROCEDURE — 82962 GLUCOSE BLOOD TEST: CPT

## 2023-11-19 PROCEDURE — 72125 CT NECK SPINE W/O DYE: CPT | Mod: MA

## 2023-11-19 PROCEDURE — 99284 EMERGENCY DEPT VISIT MOD MDM: CPT | Mod: 25

## 2023-11-19 PROCEDURE — 72125 CT NECK SPINE W/O DYE: CPT | Mod: 26,MA

## 2023-11-19 NOTE — ED PROVIDER NOTE - NSFOLLOWUPINSTRUCTIONS_ED_ALL_ED_FT
Please follow up with your PMD or Medicine Clinic in 2-3 days.  Discuss with your PMD if you need a tetanus booster  Return to the ER for worsening or concerning symptoms.  Return to ER, Urgent Care, or PMD in 5 days for staple removal.    - - - - - - - - - - - - -  Head Injury, Adult    There are many types of head injuries. They can be as minor as a small bump. Some head injuries can be worse. Worse injuries include:  A strong hit to the head that shakes the brain back and forth, causing damage (concussion).  A bruise (contusion) of the brain. This means there is bleeding in the brain that can cause swelling.  A cracked skull (skull fracture).  Bleeding in the brain that gathers, gets thick (makes a clot), and forms a bump (hematoma).  Most problems from a head injury come in the first 24 hours. However, you may still have side effects up to 7–10 days after your injury. It is important to watch your condition for any changes. You may need to be watched in the emergency department or urgent care, or you may need to stay in the hospital.    What are the causes?  There are many possible causes of a head injury. A serious head injury may be caused by:  A car accident.  Bicycle or motorcycle accidents.  Sports injuries.  Falls.  Being hit by an object.  What are the signs or symptoms?  Symptoms of a head injury include a bruise, bump, or bleeding where the injury happened. Other physical symptoms may include:  Headache.  Feeling like you may vomit (nauseous) or vomiting.  Dizziness.  Blurred or double vision.  Being uncomfortable around bright lights or loud noises.  Shaking movements that you cannot control (seizures).  Feeling tired.  Trouble being woken up.  Fainting or loss of consciousness.  Mental or emotional symptoms may include:  Feeling grumpy or cranky.  Confusion and memory problems.  Having trouble paying attention or concentrating.  Changes in eating or sleeping habits.  Feeling worried or nervous (anxious).  Feeling sad (depressed).  How is this treated?  Treatment for this condition depends on how severe the injury is and the type of injury you have. The main goal is to prevent problems and to allow the brain time to heal.    Mild head injury    If you have a mild head injury, you may be sent home, and treatment may include:  Being watched. A responsible adult should stay with you for 24 hours after your injury and check on you often.  Physical rest.  Brain rest.  Pain medicines.  Severe head injury    If you have a severe head injury, treatment may include:  Being watched closely. This includes staying in the hospital.  Medicines to:  Help with pain.  Prevent seizures.  Help with brain swelling.  Protecting your airway and using a machine that helps you breathe (ventilator).  Treatments to watch for and manage swelling inside the brain.  Brain surgery. This may be needed to:  Remove a collection of blood or blood clots.  Stop the bleeding.  Remove a part of the skull. This allows room for the brain to swell.  Follow these instructions at home:  Activity    Rest.  Avoid activities that are hard or tiring.  Make sure you get enough sleep.  Let your brain rest. Do this by limiting activities that need a lot of thought or attention, such as:  Watching TV.  Playing memory games and puzzles.  Job-related work or homework.  Working on the computer, social media, and texting.  Avoid activities that could cause another head injury until your doctor says it is okay. This includes playing sports. Having another head injury, especially before the first one has healed, can be dangerous.  Ask your doctor when it is safe for you to go back to your normal activities, such as work or school. Ask your doctor for a step-by-step plan for slowly going back to your normal activities.  Ask your doctor when you can drive, ride a bicycle, or use heavy machinery. Do not do these activities if you are dizzy.  Lifestyle      Do not drink alcohol until your doctor says it is okay.  Do not use drugs.  If it is harder than usual to remember things, write them down.  If you are easily distracted, try to do one thing at a time.  Talk with family members or close friends when making important decisions.  Tell your friends, family, a trusted co-worker, and  about your injury, symptoms, and limits (restrictions). Have them watch for any problems that are new or getting worse.  General instructions    Take over-the-counter and prescription medicines only as told by your doctor.  Have someone stay with you for 24 hours after your head injury. This person should watch you for any changes in your symptoms and be ready to get help.  Keep all follow-up visits as told by your doctor. This is important.  How is this prevented?    Work on your balance and strength. This can help you avoid falls.  Wear a seat belt when you are in a moving vehicle.  Wear a helmet when you:  Ride a bicycle.  Ski.  Do any other sport or activity that has a risk of injury.  If you drink alcohol:  Limit how much you use to:  0–1 drink a day for nonpregnant women.  0–2 drinks a day for men.  Be aware of how much alcohol is in your drink. In the [place] one drink equals one 12 oz bottle of beer (355 mL), one 5 oz glass of wine (148 mL), or one 1½ oz glass of hard liquor (44 mL).  Make your home safer by:  Getting rid of clutter from the floors and stairs. This includes things that can make you trip.  Using grab bars in bathrooms and handrails by stairs.  Placing non-slip mats on floors and in bathtubs.  Putting more light in dim areas.  Where to find more information  Centers for Disease Control and Prevention: www.cdc.gov  Get help right away if:  You have:  A very bad headache that is not helped by medicine.  Trouble walking or weakness in your arms and legs.  Clear or bloody fluid coming from your nose or ears.  Changes in how you see (vision).  A seizure.  More confusion or more grumpy moods.  Your symptoms get worse.  You are sleepier than normal and have trouble staying awake.  You lose your balance.  The black centers of your eyes (pupils) change in size.  Your speech is slurred.  Your dizziness gets worse.  You vomit.  These symptoms may be an emergency. Do not wait to see if the symptoms will go away. Get medical help right away. Call your local emergency services (911 in the [place] Do not drive yourself to the hospital.    Summary  Head injuries can be as minor as a small bump. Some head injuries can be worse.  Treatment for this condition depends on how severe the injury is and the type of injury you have.  Have someone stay with you for 24 hours after your head injury.  Ask your doctor when it is safe for you to go back to your normal activities, such as work or school.  To prevent a head injury, wear a seat belt in a car, wear a helmet when you use a bicycle, limit your alcohol use, and make your home safer.  This information is not intended to replace advice given to you by your health care provider. Make sure you discuss any questions you have with your health care provider.  - - - - - - - - - - - - -  Laceration Care, Adult  A laceration is a cut that may go through all layers of the skin. The cut may also go into the tissue that is right under the skin. Some cuts heal on their own. Other cuts need to be closed with stitches (sutures), staples, skin adhesive strips, or skin glue. Taking care of your cut lowers your risk of infection, helps your injury heal better, and may prevent scarring.    General tips  Keep your wound clean and dry.  Do not scratch or pick at your wound.  Wash your hands with soap and water for at least 20 seconds before and after touching your wound or changing your bandage (dressing). If you cannot use soap and water, use hand .  Do not usedisinfectants or antiseptics, such as rubbing alcohol, to clean your wound unless told by your doctor.  If you were given a bandage, change it at least once a day, or as told by your doctor. You should also change it if it gets wet or dirty.  How to take care of your cut  If your doctor used stitches or staples:    Keep the wound fully dry for the first 24 hours, or as told by your doctor. After that, you may take a shower or a bath. Do not soak the wound in water until after the stitches or staples have been taken out.  Clean the wound once a day, or as told by your doctor. To do this:  Wash the wound with soap and water.  Rinse the wound with water to remove all soap.  Pat the wound dry with a clean towel. Do not rub the wound.  After you clean the wound, put a thin layer of antibiotic ointment, another ointment, or a nonstick bandage on it as told by your doctor. This will help to:  Prevent infection.  Keep the bandage from sticking to the wound.  Have your stitches or staples taken out as told by your doctor.  If your doctor used skin adhesive strips:    Do not get the skin adhesive strips wet. You can take a shower or a bath, but keep the wound dry.  If the wound gets wet, pat it dry with a clean towel. Do not rub the wound.  Skin adhesive strips fall off on their own. You can trim the strips as the wound heals. Do not take off any strips that are still stuck to the wound unless told by your doctor. The strips will fall off after a while.  If your doctor used skin glue:    You may take a shower or a bath, but try to keep the wound dry. Do not soak the wound in water.  After you take a shower or a bath, pat the wound dry with a clean towel. Do not rub the wound.  Do not do any activities that will make you sweat a lot until the skin glue has fallen off.  Do not apply liquid, cream, or ointment medicine to your wound while the skin glue is still on.  If a bandage is placed over the wound, do not put tape right on top of the skin glue.  Do not pick at the glue. The skin glue usually stays on for 5–10 days. Then, it falls off the skin.  Follow these instructions at home:  Medicines    Take over-the-counter and prescription medicines only as told by your doctor.  If you were prescribed an antibiotic medicine, take or apply it as told by your doctor. Do not stop using it even if you start to feel better.  Managing pain and swelling    If told, put ice on the injured area. To do this:  Put ice in a plastic bag.  Place a towel between your skin and the bag.  Leave the ice on for 20 minutes, 2–3 times a day.  Take off the ice if your skin turns bright red. This is very important. If you cannot feel pain, heat, or cold, you have a greater risk of damage to the area.  Raise the injured area above the level of your heart while you are sitting or lying down.  General instructions    Two wounds closed with skin glue. One is normal. The other is red with pus and infected.  Avoid any activity that could make your wound reopen.  Check your wound every day for signs of infection. Check for:  More redness, swelling, or pain.  Fluid or blood.  Warmth.  Pus or a bad smell.  Keep all follow-up visits.  Contact a doctor if:  You got a tetanus shot and you have any of these problems where the needle went in:  Swelling.  Very bad pain.  Redness.  Bleeding.  A wound that was closed breaks open.  You have a fever.  You have any of these signs of infection in your wound:  More redness, swelling, or pain.  Fluid or blood.  Warmth.  Pus or a bad smell.  You see something coming out of the wound, such as wood or glass.  Medicine does not make your pain go away.  You notice a change in the color of your skin near your wound.  You need to change the bandage often.  You have a new rash.  You lose feeling (have numbness) around the wound.  Get help right away if:  You have very bad swelling around the wound.  Your pain suddenly gets worse and is very bad.  You have painful lumps near the wound or on skin anywhere on your body.  You have a red streak going away from your wound.  The wound is on your hand or foot, and:  You cannot move a finger or toe.  Your fingers or toes look pale or bluish.  Summary  A laceration is a cut that may go through all layers of the skin. The cut may also go into the tissue right under the skin.  Some cuts heal on their own. Others need to be closed with stitches, staples, skin adhesive strips, or skin glue.  Follow your doctor's instructions for caring for your cut. Proper care of a cut lowers the risk of infection, helps the cut heal better, and may prevent scarring.  This information is not intended to replace advice given to you by your health care provider. Make sure you discuss any questions you have with your health care provider.

## 2023-11-19 NOTE — ED PROVIDER NOTE - PATIENT PORTAL LINK FT
You can access the FollowMyHealth Patient Portal offered by [facility] by registering at the following website: [email or web address]. By joining [employer]’s FollowMyHealth portal, you will also be able to view your health information using other applications (apps) compatible with our system.

## 2023-11-19 NOTE — ED PROVIDER NOTE - NSFOLLOWUPCLINICS_GEN_ALL_ED_FT
[facility]  Internal Medicine  [address] [place] [address]  Phone: [phone]  Fax: [phone]  Follow Up Time: 1-3 Days

## 2023-11-19 NOTE — ED PROVIDER NOTE - PHYSICAL EXAMINATION
Gen:  Awake, alert, NAD, WDWN, non-toxic appearing. No skull/facial tender, no step-offs, no raccoon/lozano.  Eyes:  PERRL, EOMI, no icterus, normal lids/lashes, normal conjunctivae.  ENT:  External inspection normal, pink/moist membranes. Pharynx normal, no pharyngeal erythema/exudate, no drooling, no lip/tongue/palate/posterior pharynx edema, midline uvula, no oropharyngeal ulcerations/lesions. No dental pain/tender, no septal hematoma, no sinus/tmj/dental/temporal/mastoid tender.  CV:  S1S2, regular rate and rhythm, no murmur/gallops/rubs, no JVD, 2+ pulses b/l, no edema/cords/homans, warm/well-perfused.  Resp:  Normal respiratory rate/effort, no respiratory distress, normal voice, speaking full sentences, lungs clear to auscultation b/l, no wheezing/rales/rhonchi, no retractions, no stridor.  Abd:  Soft abdomen, no tender/distended/guarding/rebound, no pulsatile mass, no CVA tender.   Musculoskeletal:  N/V intact, FROM all 4 extremities, normal motor tone, stable gait. Pelvis stable, no TLS spinal tender/deformity/step-offs, no yesenia tender/deformity.  Neck:  FROM neck, supple, trachea midline, no meningismus. No C-spine tender/deformity/step-offs. Nexus negative.  Skin:  Color normal for race, warm and dry, no rash. Vertex of scalp w superficial abrasion. Occipital scalp w 1.5cm stellate lesion to level of epidermis, no galeal involvement. No active bleeding, well approximating, no surrounding/streaking erythema, no purulent drainage, no fluctuance, no crepitus, no lymphangitic streaking. Wound explored to base and no galeal involvement.  Neuro:  Oriented x3, CN 2-12 intact, normal motor, normal sensory, normal cerebellar, normal gait. GCS 15  Psych:  Attention normal. Affect normal. Behavior normal. Judgment normal.

## 2023-11-19 NOTE — ED ADULT NURSE NOTE - PAIN RATING/NUMBER SCALE (0-10): ACTIVITY
Patient Demographics     Address  [address] [place] [id number] Phone  [phone] [facility])  [phone] (Work)  [phone] [facility]) E-mail Address  [email or web address]      Emergency Contact(s)     Name Relation Home Work Mobile 0 (no pain/absence of nonverbal indicators of pain) ? Usually allowed after four to six weeks. Discuss specific work activities with your surgeon. Restrictions  ? For hip replacement surgery, follow instructions provided by physical therapy    No smoking  ? Avoid smoking.  It is known to cause breathing ? Review anticoagulant education information sheet provided. Discomfort  ? Surgical discomfort is normal for one to two months. ? Have realistic goals and keep a positive outlook. ?  Keep pain manageable; pain should not disrupt your sleep or activitie hand  as soon as they walk in your house-whether they live there or are visiting. ? Keep bed linen/clothing freshly laundered. ? Do not allow others or pets to touch your incision. ? Avoid people that have colds or the flu. ?  Your surgeon KRISTOFER ? Increased severe leg or groin pain  ? Turning in or out of surgical leg that is new  ? Increased numbness, tingling to leg  ? Inability to walk or put weight on your surgical leg      Signs of blood clot  ?  Pain, excessive tenderness, redness, or swellin Your Worker's Comp  has referred you to [facility] ([phone]). This agency will arrange for a home health care provider for home RN and PT.   You can call them to receive an update to the status of your home health care referral and to find out Please  your prescriptions at the location directed by your doctor or nurse    Bring a paper prescription for each of these medications  aspirin  MG Tbec  HYDROcodone-acetaminophen  MG Tabs           [id number] - MAR ACTION REPORT  (last Recent Vital Signs       Most Recent Value   Vitals  122/71 Filed at 09/19/2019 1142   Pulse  79 Filed at 09/19/2019 1142   Resp  12 Filed at 09/19/2019 1142   Temp  98.8 °F (37.1 °C) Filed at 09/19/2019 1142   SpO2  96 % Filed at 09/19/2019 1142      Liz  1957 MRN [id number]   [facility] SURGERY Attending Inessa Chavez MD   [facility] Day # 0 PCP Ludy Kennedy. Niko OrDO elisabeth     Date of Admission:  (Not on file)    History of Present Illness:  Wen Perez is a(n) 57year old male.   The General: Alert, orientated x3. Cooperative. No apparent distress. Vital Signs:  Height 6' (1.829 m), weight 220 lb (99.8 kg). HEENT: Exam is unremarkable. Neck: No tenderness to palpitation. No JVD. Supple.    Lungs: Clear to auscultation bilaterally elective XAVIER. Patient without complaints at this time, he brought in his mask.     Past Medical History:[AS.1]  Past Medical History:   Diagnosis Date   • Esophageal reflux    • Essential hypertension    • Muscle weakness     cane   • Obesity    • Osteoart Multiple Vitamins-Minerals (QC MENS DAILY MULTIVITAMIN) Oral Tab Take 1 tablet by mouth daily. Disp:  Rfl:    Diclofenac Sodium 75 MG Oral Tab EC Take 1 tablet (75 mg total) by mouth 2 (two) times daily.  Disp: 60 tablet Rfl: 2   Lansoprazole 15 MG Oral Cap 1. XAVIER:  PT/OT, Pain control, ASA per ortho  2. HTN:  Hold bp meds for now  3. AILEEN:  ALIEEN protocol    Quality:  · DVT Prophylaxis: ASA per ortho  · CODE status: Full Code  · Sorto: None    Plan of care discussed with Patient, RN.     Adelina Gutierres MD  9/18/2 • COLONOSCOPY  04/09/2018    normal   • KNEE REPLACEMENT SURGERY Left    • LUMBAR EPIDURAL N/A 10/14/2014    Performed by Zane Johns MD at [address]     • [address]]  \" participated in seated and standing therex per XAVIER protocol. Pt required cues for proper body mechanics, technique and sequencing. Pt gait trained 200 ' c RW and CGA c cues for WBAT, proper integration of RW and reciprocal gait pattern.  Pt able to recall Pt continues to present with impaired strength , endurance and balance below PLOF and will continue to benefit from ongoing IP PT to maximize functional independence.   The AM-PAC '6-Clicks' Inpatient Basic Mobility Short Form was completed and this patient Type of Evaluation: Initial  Physician Order: PT Eval and Treat    Presenting Problem: s/p left XAVIER 9/18/19  Reason for Therapy: Mobility Dysfunction and Discharge Planning    History related to current admission: Pt admitted 9/18/19 for elective left XAVIER. Weight Bearing Restriction: L lower extremity           L Lower Extremity: Weight Bearing as Tolerated    PAIN ASSESSMENT  Ratin(during amb, no pain at rest)  Location: left hip  Management Techniques: Activity promotion; Body mechanics    COGNITION  · precautions+no hip flx >90 degrees, goals for session. Pt able to perform ankle pumps, reports return of sensation left LE, able to perform left quad set and SAQ with good quality.   Pt transferred supine to sit with no physical assist, also demonstrated s limitations in independent bed mobility, transfers, and gait. The patient is below baseline and would benefit from skilled inpatient PT to address the above deficits to assist patient in returning to prior to level of function.   DISCHARGE RECOMMENDATIONS 1630:  Re attempted, same result, not yet appropriate for out of bed activity.   rn aware.[SP.2]     Attribution Lewis    SP.1 - Page Juan PT on 9/18/2019  3:21 PM  SP.2 - Page Juan PT on 9/18/2019  4:30 PM               Physical Therapy Note si * No active hospital problems. *[MM.2]      Past Medical History[MM. 1]  Past Medical History:   Diagnosis Date   • Esophageal reflux    • Essential hypertension    • Muscle weakness     cane   • Obesity    • Osteoarthritis    • Unspecified sleep apnea Management Techniques: Repositioning;Relaxation[MM. 2]    COGNITION[MM. 1]  wfl[MM. 3]    RANGE OF MOTION AND STRENGTH ASSESSMENT    Upper extremity ROM is within functional limits   Upper extremity strength is within functional limits    NEUROLOGICAL FINDING completed toilet transfer with supervision using commode frame to simulate comfort height toilet at home. Patient transferred to recliner with supervision. He was educated to have supervision for initial shower at home for safety, understanding voiced.   O Clinical Decision Making[MM. 1]  LOW - Analysis of occupational profile, problem-focused assessments, limited treatment options[MM. 4]    Overall Complexity[MM. 1]  LOW[MM.4]     OT Discharge Recommendations: Home(assist during recovery)  OT Device Recommenda

## 2023-11-19 NOTE — ED PROVIDER NOTE - OBJECTIVE STATEMENT
63 male with hx of IDDM  Pt presenting to the ED reporting loss of balance and fall while going up stairs w closed head injury ~7:30pm.   No blood thinner use.   Patient in usual state of health prior. No other injury, no other complaints.

## 2023-11-19 NOTE — ED PROVIDER NOTE - NS ED ROS FT
Constitutional: (-) fever (-) chills  HENT: (-) congestion (-) rhinorrhea (-) sore throat  Eyes: (-) pain (-) redness  Respiratory: (-) cough (-) shortness of breath (-) wheezing (-) stridor    Cardiovascular: (-) chest pain (-) palpitations (-) leg swelling  Gastrointestinal: (-) abdominal pain (-) blood in stool (no melena/hematochezia) (-) diarrhea (-) vomiting  Genitourinary: (-) dysuria (-) hematuria  Musculoskeletal: (-) gait problem (-) joint swelling (-) myalgias  Skin: (-) color change (-) rash (+) wound  Neurological: (-) weakness (-) numbness (-) headaches  Psychiatric/Behavioral: (-) confusion

## 2023-11-19 NOTE — ED PROVIDER NOTE - PROGRESS NOTE DETAILS
Results reviewed.  Pt reports feeling better.  Tolerating PO intake.  Pt advised regarding symptomatic/supportive care, importance of PMD follow up, and symptoms to prompt ED return.

## 2023-11-19 NOTE — ED PROVIDER NOTE - CLINICAL SUMMARY MEDICAL DECISION MAKING FREE TEXT BOX
63 male with hx of IDDM reporting loss of balance and fall while going up stairs w closed head injury ~7:30pm. No blood thinner use. No LOC. Unsure last tetanus PPX.   Vitals w elevated BP  Nontoxic appearing, n/v intact. Airway intact, no respiratory distress, no hypoxia. No abdominal or CVA tenderness. Non-focal neuro.  Incidental FS 59    Plan to obtain:    -Serial FS, CT head/cspine (r/o intracranial hemorrhage or mass, r/o c-spine fx), analgesia/antiemetics as needed, observe/re-assess     Repeat FS normal. Pt tolerating PO intake in ED w no symptoms of hypoglycemia or low FS during observation  CT showing no acute traumatic pathology  Pt declined tetanus PPX in ED.  Wound care provided and laceration repaired.   Pt advised regarding need for close outpatient follow up.  Patient stable for further care in outpatient setting. No indication for inpatient admission at this time. Patient advised regarding symptomatic & supportive care and symptoms to prompt ED return. Strict return precautions provided.

## 2023-11-19 NOTE — ED ADULT NURSE NOTE - OBJECTIVE STATEMENT
Patient BIBA c/o head laceration x2 s/p fall downstairs this evening. Patient states "its my sugar". Patient denies dizziness NVD or fever. FS 59 refusing IV line snack given with repeat FS 81 MD aware.

## 2023-11-19 NOTE — ED ADULT NURSE NOTE - ED STAT RN HANDOFF DETAILS 2
Patient verbalized understanding D/C instructions to: Follow up with PCP & return for sudden or worsening symptoms.

## 2023-11-19 NOTE — ED ADULT NURSE NOTE - NSFALLRISKINTERV_ED_ALL_ED
Communicate fall risk and risk factors to all staff, patient, and family/Provide visual cue: yellow wristband, yellow gown, etc/Reinforce activity limits and safety measures with patient and family/Call bell, personal items and telephone in reach/Instruct patient to call for assistance before getting out of bed/chair/stretcher/Non-slip footwear applied when patient is off stretcher/[place] to call system/Physically safe environment - no spills, clutter or unnecessary equipment/Purposeful Proactive Rounding/Room/bathroom lighting operational, light cord in reach Was The Patient On Physician Recommended Anticoagulation Therapy?: Please Select the Appropriate Response

## 2023-11-20 VITALS
TEMPERATURE: 99 F | OXYGEN SATURATION: 99 % | RESPIRATION RATE: 18 BRPM | DIASTOLIC BLOOD PRESSURE: 75 MMHG | HEART RATE: 71 BPM | SYSTOLIC BLOOD PRESSURE: 144 MMHG

## 2024-05-30 NOTE — ED ADULT TRIAGE NOTE - INTERNATIONAL TRAVEL
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  Labs: No results for input(s): \"ALBUMIN\", \"GLUCOSE\", \"HGB\", \"INR\", \"WBC\", \"A1C\", \"CRP\" in the last 09969 hours.    Invalid input(s): \"PREALBUMIN\", \"MICROBIO\"  Nutrition requirements were discussed with patient today.  Vitals:  There were no vitals taken for this visit.  Wound:   Wound (used by OP [facility] only) 04/28/23 1106 coccyx pressure injury (Active)   Thickness/Stage Stage 4 05/30/24 1319   Base granulating;slough 05/30/24 1319   Periwound macerated 05/30/24 1319   Periwound Temperature warm 05/30/24 1319   Periwound Skin Turgor soft 05/30/24 1319   Edges open 05/30/24 1319   Length (cm) 5.3 05/30/24 1319   Width (cm) 3.4 05/30/24 1319   Depth (cm) 1.5 05/30/24 1319   Wound (cm^2) 18.02 cm^2 05/30/24 1319   Wound Volume (cm^3) 27.03 cm^3 05/30/24 1319   Wound healing % -74.27 05/30/24 1319   Tunneling [Depth (cm)/Location] 12 oclock/ 7.2 cm and 11 oclock/ 8 cm 01/23/24 0855   Undermining [Depth (cm)/Location] 8.3 cm/ 9-5 oclock 05/30/24 1319   Drainage Characteristics/Odor serosanguineous 05/30/24 1319   Drainage Amount copious 05/30/24 1319   Care, Wound non-select wound debridement performed. 05/30/24 1319       Wound (used by OP [facility] only) 04/28/23 1108 Right greater trochanter pressure injury (Active)   Thickness/Stage Stage 4 05/30/24 1319   Base granulating;purple 05/30/24 1319   Periwound macerated 05/30/24 1319   Periwound Temperature warm 05/30/24 1319   Periwound Skin Turgor soft 05/30/24 1319   Edges open 05/30/24 1319   Length (cm) 2.2 05/30/24 1319   Width (cm) 3.1 05/30/24 1319   Depth (cm) 0.2 05/30/24 1319   Wound (cm^2) 6.82 cm^2 05/30/24 1319   Wound Volume (cm^3) 1.36 cm^3 05/30/24 1319   Wound healing % 76.99 05/30/24 1319   Drainage " Characteristics/Odor serosanguineous 05/30/24 1319   Drainage Amount moderate 05/30/24 1319   Care, Wound non-select wound debridement performed. 05/30/24 1319       Wound (used by OP [facility] only) 02/22/24 0811 Right ischial tuberosity pressure injury (Active)   Thickness/Stage Stage 3 05/30/24 1319   Base epithelialization;pink 05/30/24 1319   Periwound intact 05/30/24 1319   Periwound Temperature warm 05/30/24 1319   Periwound Skin Turgor soft 05/30/24 1319   Edges open 05/30/24 1319   Length (cm) 0.1 05/30/24 1319   Width (cm) 0.1 05/30/24 1319   Depth (cm) 0.1 05/30/24 1319   Wound (cm^2) 0.01 cm^2 05/30/24 1319   Wound Volume (cm^3) 0 cm^3 05/30/24 1319   Wound healing % 99.83 05/30/24 1319   Drainage Characteristics/Odor serosanguineous 05/30/24 1319   Drainage Amount moderate 05/30/24 1319   Care, Wound non-select wound debridement performed. 05/30/24 1319      Photo:             Further instructions from your care team         05/30/2024   Jamaal West   2004  A DME order for supplies ordered by Home care  [facility] [phone] Fax [phone]     PLAN: 05/30/24   Daily Home Care wound visits  Plan 6/17/24: Flap Surgery with Dr Fernandez; plan to recover in Group Home; if needs more than 1/day Antibiotic will need to go to a facility. Will be in hospital 3-5 days  Will need to lie flat in bed for 1 month; changing positions side to side ONLY; head of bed <30 degrees even with meals; will have Home Care nurse to monitor incision and provide wound care; one month appointment can be video if Home care nurse is present. Will begin sitting protocol slowly if incision OK.    Continue Sathya supplements provided to increase protein intake  Done: History & Physical 30 days prior to surgery  Done: Temporary Colostomy and straight cath  Patient denies use: No nicotine use past 4 months   Done: Patient is using a Group 2 mattress due to large, stage 4 pressure ulceration on the patient's  pelvis that has failed to improve on a normal mattress despite regular wound cares and repositioning. A group 1 mattress is not adequate to offload these severe ulcerations. Patient has impaired sensation and is unable to reposition independently.  Done: wheel chair cushion pressure mapped   August: due new Power Wheelchair with standing capability and tilt and space feature  Ongoing compliance with offloading     Expectations for rehab post-flap surgery:  100% bed rest with HOB <30 degrees for at least 4 weeks in a setting that has 24 hour care (TCU, SNF etc). If any issues with wound dehiscence this may be prolonged. It has been increasingly more difficult to find placement in facilities for patients post-flap, if patient has sufficient services in their current living situation rehab at home may be considered. Flap surgery does not guarantee facility placement.  Attend a 4 week post-op appointment at the [facility]. Patient must arrive via stretcher transport to be arranged by patient or facility. This may cost $250-$800 out of pocket. Patient may not sit up (in bed or otherwise) until cleared at the post-op appointment. At that point patient will be given a detailed seating protocol which will instruct patient how to gradually begin sitting up in bed. Next, patient's seating system will need to be pressure mapped before starting the wheelchair portion of the sitting protocol.  Continue to be nicotine free at least 8 weeks after surgery.     Wound Dressing Change: Right trochanter  - Wash your hands and prepare surface with soap and water  - Cleanse with mild unscented soap (such as Cetaphil, Cerave or Dove) and water  - Apply small amount of VASHE on gauze, to wound bed, for 10 minutes, remove gauze  Cut and apply 1/5 piece of 4x4 Polymem Pink  - cover with 5x5 Zetuvit Plus silicone border  Change daily and as needed for soilage     Wound Dressing Change: Right Ischial Tuberosity  - Cleanse  "with mild unscented soap (such as Cetaphil, Cerave or Dove) and water  Cover with 5x5 Zetuvit Plus Silicone border  Change Daily and as needed for soilage     Wound Dressing Change: Coccyx  - Wash your hands and prepare surface with soap and water  - Cleanse with mild unscented soap (such as Cetaphil, Cerave or Dove) and water  - Apply small amount of VASHE on gauze,to wound bed, for 10 minutes, remove gauze   - fluff and tuck 1/4 roll of dry 4\" AMD roll gauze into undermine and wound defect  - cover with 5x9\" ABD and secure with Medipore 2\" tape  Change twice daily      Repositioning:  Bed: Reposition MINIMALLY every 1-2 hours in bed to relieve pressure and promote perfusion to tissue.  Patient is using a Group 2 mattress due to large, stage 4 pressure ulceration on the patient's pelvis that has failed to improve on a normal mattress despite regular wound cares and repositioning. A group 1 mattress is not adequate to offload these severe ulcerations. Patient has impaired sensation and is unable to reposition independently.  Chair: When up to the chair, do not sit for longer than one hour total before returning to bed for at least 60 minutes to relieve pressure and promote perfusion to the tissue. Completely recline/tilt for 15 minutes each hour.  Sit on a chair cushion when up to the chair.     A diet high in protein is important for wound healing, we recommend getting 90 grams of protein per day. Taking protein shakes or bars are a good way to get extra protein in your diet. Good sources of protein:  Pork 26g per 3 oz  Whey protein powder - 24g per scoop (on average)  Greek yogurt - 23g per 8oz  Chicken or Turkey - 23g per 3oz  Fish - 20-25g per 3oz  Beef - 18-23g per 3oz  Navy beans - 20g per cup  Cottage cheese - 14g per 1/2 cup  Lentils - 13g per 1/4 cup  Beef jerky 13g per 1oz  2% milk - 8g per cup  Peanut butter - 8g per 2 tablespoons  Eggs - 6g per egg  Mixed nuts - 6g per 2oz     Main Provider: Lauri" MARGARETTE Fernandez May 30, 2024     No

## 2025-01-27 NOTE — ED PROVIDER NOTE - CARDIAC, MLM
Normal rate, regular rhythm.  Heart sounds S1, S2.  No murmurs, rubs or gallops.
Render In Strict Bullet Format?: No
Detail Level: Zone
Initiate Treatment: triamcinolone acetonide 0.1 % topical ointment BID\\nQuantity: 453.6 g  Days Supply: 30\\nSig: Apply a thin layer to affected areas on lower legs twice daily for 2 weeks on, 1 week off. Repeat PRN flaring only.

## 2025-05-29 NOTE — ED PROVIDER NOTE - AGGRAVATING FACTORS
[facility] New Patient Questionnaire    Diagnosis/Reason for Referral?    Chronic Bilateral Low Back Pain  Abnormal MRI Lumbar Spine  Chronic SI Joint Pain     Office Notes: yes  Imaging : MRI Lumbar Spine Without Contrast[facility]  Patient has appointment with Orthopedic Surgery for hip pain ( July 1 ,2025)  No surgery listed for Back, Neck or Brain    2. Who is completing questionnaire?      Patient  Caregiver Family      3. Has the patient had any previous spinal/brain surgeries?  NO      A. If yes, what is the name of the facility in which the surgery was performed?       B. Procedure/Surgery performed?       C. Who was the surgeon?       D. When was the surgery?    MM/YY       E. Did the patient improve after the surgery?        4. Is this a second opinion?   If yes, Dr. Calixto would like to review patient first before making the appointment.      5. Have MRI Images been obtain within the last year?     Yes  No      XR  CT     If yes, where was the imaging performed?  [facility]    If yes, what part of the body?      Lumbar  Cervical  Thoracic  Brain     If yes, when was it obtained?          Note: if the scan was performed at a facility other than [facility], the disc will need to be brought to the appointment or we need to reach out to obtain the disc.     A. Was the patient instructed to provide the disc?      Yes   No      8. Has the patient had a NCV/EMG within the last year?      Yes  No     If yes, where was it performed and date?      MM/YY  Location:      9. Has the patient been to Physical Therapy?      Yes  No     If yes, what location, how long attended, and last visit?    Location:        Therapy Lasted:    Date of Last Visit:      10. Has the patient been to Pain Management?     Yes  No     If yes, what location and last visit     Location: [facility]   Last Visit:   Is it helping?   
none